# Patient Record
Sex: MALE | Race: BLACK OR AFRICAN AMERICAN | Employment: FULL TIME | URBAN - METROPOLITAN AREA
[De-identification: names, ages, dates, MRNs, and addresses within clinical notes are randomized per-mention and may not be internally consistent; named-entity substitution may affect disease eponyms.]

---

## 2020-11-12 ENCOUNTER — OFFICE VISIT (OUTPATIENT)
Dept: ORTHOPEDIC SURGERY | Age: 60
End: 2020-11-12
Payer: COMMERCIAL

## 2020-11-12 VITALS — WEIGHT: 315 LBS | BODY MASS INDEX: 44.1 KG/M2 | HEIGHT: 71 IN

## 2020-11-12 PROCEDURE — 99213 OFFICE O/P EST LOW 20 MIN: CPT | Performed by: ORTHOPAEDIC SURGERY

## 2020-11-12 RX ORDER — LIDOCAINE HYDROCHLORIDE 10 MG/ML
5 INJECTION, SOLUTION INFILTRATION; PERINEURAL ONCE
Status: COMPLETED | OUTPATIENT
Start: 2020-11-12 | End: 2020-11-13

## 2020-11-12 RX ORDER — BETAMETHASONE SODIUM PHOSPHATE AND BETAMETHASONE ACETATE 3; 3 MG/ML; MG/ML
6 INJECTION, SUSPENSION INTRA-ARTICULAR; INTRALESIONAL; INTRAMUSCULAR; SOFT TISSUE ONCE
Status: COMPLETED | OUTPATIENT
Start: 2020-11-12 | End: 2020-11-13

## 2020-11-12 NOTE — PROGRESS NOTES
Date of Encounter: 11/12/2020  Patient Marge Car    Chief Complaint   Patient presents with    Knee Pain     RT knee        History of Present Illness:  Patient seen here today for his right knee. Had bilateral hip replacements back in about 2015 or 16 by me which was done extremely well. Has about a 6-month history of significant right knee pain. Is mainly posterior with some medial pain. It is worse with activity though he does have some pain at night. Denies any previous surgery of his knee. History reviewed. No pertinent past medical history. History reviewed. No pertinent surgical history. No current outpatient medications on file. Current Facility-Administered Medications   Medication Dose Route Frequency Provider Last Rate Last Dose    lidocaine 1 % injection 5 mL  5 mL Intra-articular Once Mattie Trinidad MD        betamethasone acetate-betamethasone sodium phosphate (CELESTONE) injection 6 mg  6 mg Intra-articular Once Emmie Osborne MD          allergies, social and family histories were reviewed and updated as appropriate. Review of Systems:  Relevant review of systems reviewed and available in the patient's chart and scanned in under the MEDIA tab on 11/12/2020. Vital Signs:  Ht 5' 11\" (1.803 m)   Wt (!) 315 lb (142.9 kg)   BMI 43.93 kg/m²     General Exam:   Constitutional: He is adequately groomed with no evidence of malnutrition, obesity present  Mental Status: He is oriented to time, place and person. Normal mentation and affect for age. Lymphatic: The lymphatic examination bilaterally reveals all areas to be without enlargement or induration. Vascular: Examination reveals no swelling or calf tenderness. Peripheral pulses are palpable and 2+. Neurological: He has good coordination and balance. There is no focal weakness or sensory deficit.     Pertinent Exam:  Right knee has medial joint line pain along with medial and patellofemoral crepitance and a mild to moderate synovitis. Lacks probably about 6 or 7 degrees extension about 115 degrees of flexion. No instability. No pain with logroll. Xray Findings:  4 views of the right knee show complete loss of the medial joint space with subchondral sclerosis, cyst formation and osteophyte formation. Also significant degenerative changes of patellofemoral joint. Standing AP lateral of the left knee shows near complete loss of the medial joint space    Assessment & Plan:  Patient has advanced osteoarthritis of his right knee. Lengthy discussion with him regarding the various options. He does some aqua therapy now but even it has been difficult lately. Have elected to inject his right knee today with steroid for symptomatic relief. Have discussed viscosupplementation as well as this may be a good option for him. Eventually certainly will require knee replacement. Questions have been answered. He will follow up as symptoms dictate  We reviewed continued use of prescription and OTC medications to alleviate pain. We discussed the option of cortisone injection as well as its risks and benefits. We discussed the potential to improve pain and function as variability in response to injection among patients. He agreed to receive a cortisone injection today. The right knee was prepped with Betadine and 1 mL of betamethasone mixed with 5 mL 1% lidocaine plain were instilled with careful aspiration and injection under aseptic technique. He tolerated this well and was instructed to call back over the next 3-4 days and leave a message regarding how much or how little the injection seemed to help. Documentation was done using voice recognition dragon software. Every effort was made to ensure accuracy; however, inadvertent  Unintentional computerized transcription errors may be present.

## 2020-11-13 RX ADMIN — BETAMETHASONE SODIUM PHOSPHATE AND BETAMETHASONE ACETATE 6 MG: 3; 3 INJECTION, SUSPENSION INTRA-ARTICULAR; INTRALESIONAL; INTRAMUSCULAR; SOFT TISSUE at 08:44

## 2020-11-13 RX ADMIN — LIDOCAINE HYDROCHLORIDE 5 ML: 10 INJECTION, SOLUTION INFILTRATION; PERINEURAL at 08:43

## 2020-12-15 ENCOUNTER — OFFICE VISIT (OUTPATIENT)
Dept: ORTHOPEDIC SURGERY | Age: 60
End: 2020-12-15
Payer: COMMERCIAL

## 2020-12-15 VITALS — BODY MASS INDEX: 44.1 KG/M2 | WEIGHT: 315 LBS | HEIGHT: 71 IN

## 2020-12-15 PROCEDURE — 99213 OFFICE O/P EST LOW 20 MIN: CPT | Performed by: ORTHOPAEDIC SURGERY

## 2020-12-15 PROCEDURE — G8417 CALC BMI ABV UP PARAM F/U: HCPCS | Performed by: ORTHOPAEDIC SURGERY

## 2020-12-15 PROCEDURE — 1036F TOBACCO NON-USER: CPT | Performed by: ORTHOPAEDIC SURGERY

## 2020-12-15 PROCEDURE — 3017F COLORECTAL CA SCREEN DOC REV: CPT | Performed by: ORTHOPAEDIC SURGERY

## 2020-12-15 PROCEDURE — G8484 FLU IMMUNIZE NO ADMIN: HCPCS | Performed by: ORTHOPAEDIC SURGERY

## 2020-12-15 PROCEDURE — G8427 DOCREV CUR MEDS BY ELIG CLIN: HCPCS | Performed by: ORTHOPAEDIC SURGERY

## 2020-12-15 NOTE — PROGRESS NOTES
Date of Encounter: 12/15/2020  Patient Lia Cyr    Chief Complaint   Patient presents with    Knee Pain     RT knee        History of Present Illness:  Patient seen today for follow-up of his knees. Unfortunately the steroid injection did not really help much at all. Realizes knee replacement is the ultimate solution but still does not feel ready for that. History reviewed. No pertinent past medical history. History reviewed. No pertinent surgical history. No current outpatient medications on file. No current facility-administered medications for this visit. allergies, social and family histories were reviewed and updated as appropriate. Review of Systems:  Relevant review of systems reviewed and available in the patient's chart and scanned in under the MEDIA tab on 12/15/2020. Vital Signs:  Ht 5' 11\" (1.803 m)   Wt (!) 315 lb (142.9 kg)   BMI 43.93 kg/m²     General Exam:   Constitutional: He is adequately groomed with no evidence of malnutrition, obesity present  Mental Status: He is oriented to time, place and person. Normal mentation and affect for age. Lymphatic: The lymphatic examination bilaterally reveals all areas to be without enlargement or induration. Vascular: Examination reveals no swelling or calf tenderness. Peripheral pulses are palpable and 2+. Neurological: He has good coordination and balance. There is no focal weakness or sensory deficit. Pertinent Exam:  Exam is unchanged    Xray Findings:  No new x-rays were obtained    Assessment & Plan:  Patient has bilateral knee osteoarthritis. Would like to proceed with viscosupplementation and we will get approval for that. We will see him back once that is been approved      Documentation was done using voice recognition dragon software. Every effort was made to ensure accuracy; however, inadvertent  Unintentional computerized transcription errors may be present.

## 2020-12-18 ENCOUNTER — TELEPHONE (OUTPATIENT)
Dept: ORTHOPEDIC SURGERY | Age: 60
End: 2020-12-18

## 2020-12-18 ENCOUNTER — OFFICE VISIT (OUTPATIENT)
Dept: ORTHOPEDIC SURGERY | Age: 60
End: 2020-12-18
Payer: COMMERCIAL

## 2020-12-18 VITALS — HEIGHT: 71 IN | WEIGHT: 315 LBS | BODY MASS INDEX: 44.1 KG/M2

## 2020-12-18 PROCEDURE — 3017F COLORECTAL CA SCREEN DOC REV: CPT | Performed by: ORTHOPAEDIC SURGERY

## 2020-12-18 PROCEDURE — G8427 DOCREV CUR MEDS BY ELIG CLIN: HCPCS | Performed by: ORTHOPAEDIC SURGERY

## 2020-12-18 PROCEDURE — G8417 CALC BMI ABV UP PARAM F/U: HCPCS | Performed by: ORTHOPAEDIC SURGERY

## 2020-12-18 PROCEDURE — 99212 OFFICE O/P EST SF 10 MIN: CPT | Performed by: ORTHOPAEDIC SURGERY

## 2020-12-18 PROCEDURE — G8484 FLU IMMUNIZE NO ADMIN: HCPCS | Performed by: ORTHOPAEDIC SURGERY

## 2020-12-18 PROCEDURE — 20610 DRAIN/INJ JOINT/BURSA W/O US: CPT | Performed by: ORTHOPAEDIC SURGERY

## 2020-12-18 PROCEDURE — 1036F TOBACCO NON-USER: CPT | Performed by: ORTHOPAEDIC SURGERY

## 2020-12-18 RX ORDER — HYALURONATE SODIUM 10 MG/ML
20 SYRINGE (ML) INTRAARTICULAR ONCE
Status: COMPLETED | OUTPATIENT
Start: 2020-12-18 | End: 2020-12-18

## 2020-12-18 RX ADMIN — Medication 20 MG: at 15:22

## 2020-12-18 RX ADMIN — Medication 20 MG: at 15:21

## 2020-12-18 NOTE — TELEPHONE ENCOUNTER
12/17/2020  EUFLEXXA  (SERIES OF 3)    BILATERAL KNEES. NO AUTHORIZATION REQUIRED. VALID & BILLABLE. CAN BUY& BILL. PER GORDO @ [facility] REF [id number].   AP

## 2020-12-18 NOTE — PROGRESS NOTES
Date of Encounter: 12/18/2020  Patient Adrienne Shaw    Chief Complaint   Patient presents with    Knee Pain     FAVIAN knee        History of Present Illness:  Patient seen today for follow-up of his knees. Has end-stage osteoarthritis of both knees but is elected to proceed with viscosupplementation and been approved for Euflexxa    History reviewed. No pertinent past medical history. History reviewed. No pertinent surgical history. No current outpatient medications on file. No current facility-administered medications for this visit. allergies, social and family histories were reviewed and updated as appropriate. Review of Systems:  Relevant review of systems reviewed and available in the patient's chart and scanned in under the MEDIA tab on 12/18/2020. Vital Signs:  Ht 5' 11\" (1.803 m)   Wt (!) 315 lb (142.9 kg)   BMI 43.93 kg/m²     General Exam:   Constitutional: He is adequately groomed with no evidence of malnutrition, obesity present  Mental Status: He is oriented to time, place and person. Normal mentation and affect for age. Lymphatic: The lymphatic examination bilaterally reveals all areas to be without enlargement or induration. Vascular: Examination reveals no swelling or calf tenderness. Peripheral pulses are palpable and 2+. Neurological: He has good coordination and balance. There is no focal weakness or sensory deficit. Pertinent Exam:  Knee exam is unchanged    Xray Findings:  No new x-rays were obtained    Assessment & Plan:  Patient has bilateral knee osteoarthritis. Have injected each knee with Euflexxa No. 1.  We will see him next week for the second injection      Documentation was done using voice recognition dragon software. Every effort was made to ensure accuracy; however, inadvertent  Unintentional computerized transcription errors may be present.

## 2020-12-22 ENCOUNTER — OFFICE VISIT (OUTPATIENT)
Dept: ORTHOPEDIC SURGERY | Age: 60
End: 2020-12-22
Payer: COMMERCIAL

## 2020-12-22 VITALS — WEIGHT: 315 LBS | HEIGHT: 71 IN | BODY MASS INDEX: 44.1 KG/M2

## 2020-12-22 PROCEDURE — 20610 DRAIN/INJ JOINT/BURSA W/O US: CPT | Performed by: ORTHOPAEDIC SURGERY

## 2020-12-22 RX ORDER — HYALURONATE SODIUM 10 MG/ML
20 SYRINGE (ML) INTRAARTICULAR ONCE
Status: COMPLETED | OUTPATIENT
Start: 2020-12-22 | End: 2020-12-22

## 2020-12-22 RX ADMIN — Medication 20 MG: at 16:21

## 2020-12-22 NOTE — PROGRESS NOTES
Date of Encounter: 12/22/2020  Patient Michelle Dominguez    Chief Complaint   Patient presents with    Knee Pain     FAVIAN knee       History of Present Illness:  Patient seen today for the second injection in both knees. Feels like he may have had a small amount of improvement already. History reviewed. No pertinent past medical history. History reviewed. No pertinent surgical history. No current outpatient medications on file. No current facility-administered medications for this visit. allergies, social and family histories were reviewed and updated as appropriate. Review of Systems:  Relevant review of systems reviewed and available in the patient's chart and scanned in under the MEDIA tab on 12/22/2020. Vital Signs:  Ht 5' 11\" (1.803 m)   Wt (!) 315 lb (142.9 kg)   BMI 43.93 kg/m²     General Exam:   Constitutional: He is adequately groomed with no evidence of malnutrition, obesity present  Mental Status: He is oriented to time, place and person. Normal mentation and affect for age. Lymphatic: The lymphatic examination bilaterally reveals all areas to be without enlargement or induration. Vascular: Examination reveals no swelling or calf tenderness. Peripheral pulses are palpable and 2+. Neurological: He has good coordination and balance. There is no focal weakness or sensory deficit. Pertinent Exam:  Exam is unchanged    Xray Findings:  No new x-rays were obtained    Assessment & Plan:  Patient has bilateral knee osteoarthritis. Have injected each knee with Euflexxa No. 2.  We will see him next week for the third and final injection      Documentation was done using voice recognition dragon software. Every effort was made to ensure accuracy; however, inadvertent  Unintentional computerized transcription errors may be present.

## 2020-12-29 ENCOUNTER — OFFICE VISIT (OUTPATIENT)
Dept: ORTHOPEDIC SURGERY | Age: 60
End: 2020-12-29
Payer: COMMERCIAL

## 2020-12-29 VITALS — WEIGHT: 315 LBS | HEIGHT: 71 IN | BODY MASS INDEX: 44.1 KG/M2

## 2020-12-29 PROCEDURE — 20610 DRAIN/INJ JOINT/BURSA W/O US: CPT | Performed by: ORTHOPAEDIC SURGERY

## 2020-12-29 RX ORDER — HYALURONATE SODIUM 10 MG/ML
20 SYRINGE (ML) INTRAARTICULAR ONCE
Status: COMPLETED | OUTPATIENT
Start: 2020-12-29 | End: 2020-12-29

## 2020-12-29 RX ADMIN — Medication 20 MG: at 10:38

## 2020-12-29 RX ADMIN — Medication 20 MG: at 10:39

## 2020-12-29 NOTE — PROGRESS NOTES
Date of Encounter: 12/29/2020  Patient Hussein Hussein    Chief Complaint   Patient presents with    Knee Pain     bilateral osteoarthritis       History of Present Illness:  Patient seen today for the third and final Euflexxa injection in each knee. Does feel like he has had a positive response to date. History reviewed. No pertinent past medical history. History reviewed. No pertinent surgical history. No current outpatient medications on file. Current Facility-Administered Medications   Medication Dose Route Frequency Provider Last Rate Last Admin    sodium hyaluronate (viscosup) injection 20 mg  20 mg Intra-articular Once Roberto Trinidad MD        sodium hyaluronate (viscosup) injection 20 mg  20 mg Intra-articular Once Perry Landry MD          allergies, social and family histories were reviewed and updated as appropriate. Review of Systems:  Relevant review of systems reviewed and available in the patient's chart and scanned in under the MEDIA tab on 12/29/2020. Vital Signs:  Ht 5' 11\" (1.803 m)   Wt (!) 315 lb (142.9 kg)   BMI 43.93 kg/m²     General Exam:   Constitutional: He is adequately groomed with no evidence of malnutrition, obesity present  Mental Status: He is oriented to time, place and person. Normal mentation and affect for age. Lymphatic: The lymphatic examination bilaterally reveals all areas to be without enlargement or induration. Vascular: Examination reveals no swelling or calf tenderness. Peripheral pulses are palpable and 2+. Neurological: He has good coordination and balance. There is no focal weakness or sensory deficit.     Pertinent Exam:  Exam is unchanged    Xray Findings:  No new x-rays were obtained    Assessment & Plan: Injected each knee with Euflexxa No. 3.  Discussed with him the possibly repeating this anytime after 6 months. He is really not had much success with steroid injections recently. Questions have been answered. He will follow up as symptoms dictate      Documentation was done using voice recognition dragon software. Every effort was made to ensure accuracy; however, inadvertent  Unintentional computerized transcription errors may be present.

## 2021-08-20 ENCOUNTER — HOSPITAL ENCOUNTER (OUTPATIENT)
Dept: PHYSICAL THERAPY | Age: 61
Setting detail: THERAPIES SERIES
Discharge: HOME | End: 2021-08-20
Payer: COMMERCIAL

## 2021-08-20 PROCEDURE — 97110 THERAPEUTIC EXERCISES: CPT

## 2021-08-20 PROCEDURE — 97162 PT EVAL MOD COMPLEX 30 MIN: CPT

## 2021-08-20 PROCEDURE — 97530 THERAPEUTIC ACTIVITIES: CPT

## 2021-08-20 NOTE — FLOWSHEET NOTE
[address] [facility] Physical Therapy  Phone: [phone]   Fax: [phone]    Physical Therapy Daily Treatment Note  Date:  2021    Patient Name:  Myles Kruger    :  1960  MRN: [id number]  Medical/Treatment Diagnosis Information:  Diagnosis: M17.11 (ICD-10-CM) - Unilateral primary osteoarthritis, right knee, Z96.651 (ICD-10-CM) - Presence of right artificial knee joint  Treatment Diagnosis: Decrease knee AROM , difficulty walking, imbalance  Insurance/Certification information:  PT Insurance Information: [facility]  Physician Information:  Referring Practitioner: Olive Dumont MD  Plan of care signed (Y/N): []  Yes [x]  No     Date of Patient follow up with Physician:      Progress Report: []  Yes  [x]  No     Date Range for reporting period:  Beginnin21  Ending:     Progress report due (10 Rx/or 30 days whichever is less): visit #10      Recertification due (POC duration/ or 90 days whichever is less): visit # 21    Visit # Insurance Allowable Auth required?  Date Range    Mn []  Yes  [x]  No NA       Latex Allergy:  [x]NO      []YES  Preferred Language for Healthcare:   [x]English       []other:    Functional Scale:        Date assessed:  LEFS: raw score =15 /80; dysfunction = 81%  21    Pain level:  8/10     SUBJECTIVE:  See eval    OBJECTIVE: See eval    Not  prehab just using values as baseline   Functional testing Pre hab        Date   eval post op   Date 21 4 week f/u   Date  8 week f/u  Date D/c            TUG  TBD      30 second sit to stand  TBD      6 minute walk  244      Balance        Narrow NIMOC  30s      Semi tandem        Tandem   R 30s, L 24s      SLS        Knee AROM  Flex 72, ext +2      Knee Extension MMT R/L  -3      Hip aBduction MMT R/L  -3      LEFS                      RESTRICTIONS/PRECAUTIONS:  B PRINCESS     Exercises/Interventions:     Therapeutic Exercises (96786) Resistance / level Sets/sec Reps Notes   Bike/ Nustep IB  HSS supine or seated       Quad sets       Heel slides       ERMI       Bridging                                    Therapeutic Activities (14244)                                          Neuromuscular Re-ed (65152)                                                 Manual Intervention (62047)       Patella mobs       Tibia/fib mobs/ distraction EOB                                       Modalities:     Pt. Education:  -patient educated on diagnosis, prognosis and expectations for rehab  -all patient questions were answered    Home Exercise Program:  Access Code: [email or web address]  URL: [employer]/  Date: 08/20/2021  Prepared by: Raul Byrnes    Exercises  Supine Quad Set - 1 x daily - 7 x weekly - 3 sets - 10 reps  Supine Heel Slide with Strap - 1 x daily - 7 x weekly - 3 sets - 10 reps - 5 hold  Supine Hip Abduction - 1 x daily - 7 x weekly - 3 sets - 10 reps  Supine Bridge - 1 x daily - 7 x weekly - 3 sets - 10 reps        Therapeutic Exercise and NMR EXR  [] (03971) Provided verbal/tactile cueing for activities related to strengthening, flexibility, endurance, ROM for improvements in  [] LE / Lumbar: LE, proximal hip, and core control with self care, mobility, lifting, ambulation. [] UE / Cervical: cervical, postural, scapular, scapulothoracic and UE control with self care, reaching, carrying, lifting, house/yardwork, driving, computer work.  [] (35219) Provided verbal/tactile cueing for activities related to improving balance, coordination, kinesthetic sense, posture, motor skill, proprioception to assist with   [] LE / lumbar: LE, proximal hip, and core control in self care, mobility, lifting, ambulation and eccentric single leg control.    [] UE / cervical: cervical, scapular, scapulothoracic and UE control with self care, reaching, carrying, lifting, house/yardwork, driving, computer work.   [] (07112) Therapist is in constant attendance of 2 or more patients providing skilled therapy computer work  [] (36545)Reviewed/Progressed HEP activities related to improving balance, coordination, kinesthetic sense, posture, motor skill, proprioception of   [] LE: core, proximal hip and LE for self care, mobility, lifting, and ambulation/stair navigation    [] UE / Cervical: cervical, postural,  scapular, scapulothoracic and UE control with self care, reaching, carrying, lifting, house/yardwork, driving, computer work    Manual Treatments:  PROM / STM / Oscillations-Mobs:  G-I, II, III, IV (PA's, Inf., Post.)  [] ([id number]) Provided manual therapy to mobilize LE, proximal hip and/or LS spine soft tissue/joints for the purpose of modulating pain, promoting relaxation,  increasing ROM, reducing/eliminating soft tissue swelling/inflammation/restriction, improving soft tissue extensibility and allowing for proper ROM for normal function with   [] LE / lumbar: self care, mobility, lifting and ambulation. [] UE / Cervical: self care, reaching, carrying, lifting, house/yardwork, driving, computer work. Modalities:  [] (60026) Vasopneumatic compression: Utilized vasopneumatic compression to decrease edema / swelling for the purpose of improving mobility and quad tone / recruitment which will allow for increased overall function including but not limited to self-care, transfers, ambulation, and ascending / descending stairs.      Charges:  Timed Code Treatment Minutes: 27   Total Treatment Minutes: 43     [x] EVAL - LOW (46747)   [x] EVAL - MOD (75521)  [] EVAL - HIGH (10392)  [] RE-EVAL (36406)  [x] FC(95731) x       [] Ionto  [] NMR (61996) x       [] Vaso  [] Manual (61425) x       [] Ultrasound  [x] TA x        [] Mech Traction (11237)  [] Aquatic Therapy x     [] ES (un) (61988):   [] Home Management Training x  [] ES(attended) (30824)   [] Dry Needling 1-2 muscles (79788):  [] Dry Needling 3+ muscles (217283)  [] Group:      [] Other:     GOALS:   Patient stated goal: to improve full range of motion,  []? Progressing: []? Met: []? Not Met: []? Adjusted     Therapist goals for Patient:   Short Term Goals: To be achieved in: 2 weeks  1. Independent in HEP and progression per patient tolerance, in order to prevent re-injury. []? Progressing: []? Met: []? Not Met: []? Adjusted  2. Patient will have a decrease in pain to facilitate improvement in movement, function, and ADLs as indicated by Functional Deficits. []? Progressing: []? Met: []? Not Met: []? Adjusted     Long Term Goals: To be achieved in: 7 weeks/ DC   1. Disability index score of 20% or less for the LEFS to assist with reaching prior level of function. []? Progressing: []? Met: []? Not Met: []? Adjusted  2. Patient will demonstrate increased AROM to right knee flexion to 120 deg and knee extension 0 deg to allow for proper joint functioning as indicated by patients Functional Deficits. []? Progressing: []? Met: []? Not Met: []? Adjusted  3. Patient will demonstrate an increase in Strength to at least +4/5 as well as good proximal hip strength and control to allow for proper functional mobility as indicated by patients Functional Deficits. []? Progressing: []? Met: []? Not Met: []? Adjusted  4. Patient will return to functional activities including  ambulation without AD without increased symptoms or restriction. []? Progressing: []? Met: []? Not Met: []? Adjusted  5. Patient to be able to sleep through the night without symptoms . []? Progressing: []? Met: []? Not Met: []? Adjusted       Overall Progression Towards Functional goals/ Treatment Progress Update:  [] Patient is progressing as expected towards functional goals listed. [] Progression is slowed due to complexities/Impairments listed. [] Progression has been slowed due to co-morbidities.   [x] Plan just implemented, too soon to assess goals progression <30days   [] Goals require adjustment due to lack of progress  [] Patient is not progressing as expected and requires additional follow up with physician  [] Other    Persisting Functional Limitations/Impairments:  []Sleeping []Sitting               []Standing []Transfers        [x]Walking []Kneeling               []Stairs [x]Squatting / bending   []ADLs []Reaching  []Lifting  []Housework  []Driving []Job related tasks  []Sports/Recreation []Other:        ASSESSMENT:  See eval  Treatment/Activity Tolerance:  [] Patient able to complete tx [] Patient limited by fatigue  [] Patient limited by pain  [] Patient limited by other medical complications  [] Other:     Prognosis: [] Good [] Fair  [] Poor    Patient Requires Follow-up: [x] Yes  [] No    Plan for next treatment session:      PLAN: See eval. PT 3x / week for  7 weeks. [] Continue per plan of care [] Alter current plan (see comments)  [x] Plan of care initiated [] Hold pending MD visit [] Discharge    Electronically signed by: Chelita Medley, PT PT, DPT    Note: If patient does not return for scheduled/ recommended follow up visits, this note will serve as a discharge from care along with most recent update on progress.

## 2021-08-20 NOTE — PLAN OF CARE
[address] [address], [address] [place] [address] Drive  Phone: [phone]   Fax: [phone]                                                       Physical Therapy Certification    Dear Referring Practitioner: Saqib Mccarty MD,    We had the pleasure of evaluating the following patient for physical therapy services at [address] [place] [address]. A summary of our findings can be found in the initial assessment below. This includes our plan of care. If you have any questions or concerns regarding these findings, please do not hesitate to contact me at the office phone number checked above. Thank you for the referral.       Physician Signature:_______________________________Date:__________________  By signing above (or electronic signature), therapists plan is approved by physician      Patient: Ktaie Clement   : 1960   MRN: [id number]  Referring Physician: Referring Practitioner: Saqib Mccarty MD      Evaluation Date: 2021      Medical Diagnosis Information:  Diagnosis: M17.11 (ICD-10-CM) - Unilateral primary osteoarthritis, right knee, Z96.651 (ICD-10-CM) - Presence of right artificial knee joint   Treatment Diagnosis: Decrease R knee AROM , difficulty walking, and imbalance                                         Insurance information: PT Insurance Information: [facility]     Precautions/ Contra-indications:   Latex Allergy:  [x]NO      []YES  Preferred Language for Healthcare:   [x]English       []Other:    C-SSRS Triggered by Intake questionnaire (Past 2 wk assessment ):   [x] No, Questionnaire did not trigger screening.   [] Yes, Patient intake triggered C-SSRS Screening     [] Completed, no further action required. [] Completed, PCP notified via Epic    SUBJECTIVE: Patient stated complaint: Patient recently had right TKA 21 from OA. In the past he has had B PRINCESS.  Currently having difficulty walking , standing, and sitting for prolong periods. He has had previous  B PRINCESS is well aware of the rehab process. His goal is to improve full range in left knee     Relevant Medical History:  B PRINCESS   Functional Outcome: LEFS  raw score =15 ; dysfunction =81 %    Pain Scale: 8/10  Easing factors:  Ice and rest   Provocative factors:   Standing , sitting     Type: []Constant   [x]Intermittent  []Radiating []Localized []Other:     Numbness/Tingling: over surgical site only     Occupation/School: retired    Living Status/Prior Level of Function:Prior to this injury / incident, pt was independent with ADLs and IADLs,  Patient lives with spouse two story house with steps inside. Prior to right knee surgery was very active with swimming at the [facility] . OBJECTIVE:   Palpation:  R knee edema     Functional Mobility/Transfers:  Mod I     Posture: increase hip flexion     Bandages/Dressings/Incisions: Swelling : Femur 55cm, tibia 45cm    Gait: (include devices/WB status) Gait with rolling walker antalgic pattern on RLE, decrease knee flexion     Dermatomes Normal Abnormal Comments   inguinal area (L1)  x     anterior mid-thigh (L2) x     distal ant thigh/med knee (L3) x     medial lower leg and foot (L4) x     lateral lower leg and foot (L5) x     posterior calf (S1) x     medial calcaneus (S2) x          PROM AROM    L R L R   Hip Flexion       Hip Abduction       Hip ER       Hip IR       Knee Flexion    72   Knee Extension    +2   Dorsiflexion        Plantarflexion        Inversion        Eversion            Strength (0-5) / Myotomes Left Right   Hip Flexion - supine  4   Hip Flexion - seated (L1-2) +4 4   Hip Abduction +4    Hip Adduction     Hip ER +4    Hip IR +4    Quads (L2-4) +4 3-   Hamstrings +4 3-   Ankle Dorsiflexion (L4-5) +4    Ankle Plantarflexion (S1-2)     Ankle Inversion     Ankle Eversion (S1-2)     Great Toe Extension (L5) Joint mobility:    []Normal    [x]Hypo  Right knee    []Hyper    Orthopaedic Special Tests  Positive  Negative  NT Comments    Hip   x    RANDI / Gustavo's       FADIR       Scour       Trendelenburg              Knee   x    Lachman's / Anterior Drawer       Posterior Drawer       Varus Stress       Valgus Stress       Sidney's        Appley's       Thessaly's       Patellar Tracking              Ankle   x    Anterior Drawer       Talar Tilt       Soliz       Caryl's                   Balance:  Fair                        [x] Patient history, allergies, meds reviewed. Medical chart reviewed. See intake form. Review Of Systems (ROS):  [x]Performed Review of systems (Integumentary, CardioPulmonary, Neurological) by intake and observation. Intake form has been scanned into medical record. Patient has been instructed to contact their primary care physician regarding ROS issues if not already being addressed at this time.       Co-morbidities/Complexities (which will affect course of rehabilitation):   []None        []Hx of COVID   Arthritic conditions   []Rheumatoid arthritis (M05.9)  [x]Osteoarthritis (M19.91)  []Gout   Cardiovascular conditions   []Hypertension (I10)  []Hyperlipidemia (E78.5)  []Angina pectoris (I20)  []Atherosclerosis (I70)  []Pacemaker  []Hx of CABG/stent/  cardiac surgeries   Musculoskeletal conditions   []Disc pathology   []Congenital spine pathologies   []Osteoporosis (M81.8)  []Osteopenia (M85.8)  []Scoliosis       Endocrine conditions   []Hypothyroid (E03.9)  []Hyperthyroid Gastrointestinal conditions   []Constipation (O86.07)   Metabolic conditions   []Morbid obesity (E66.01)  []Diabetes type 1(E10.65) or 2 (E11.65)   []Neuropathy (G60.9)     Cardio/Pulmonary conditions   []Asthma (J45)  []Coughing   []COPD (J44.9)  []CHF  []A-fib   Psychological Disorders  []Anxiety (F41.9)  []Depression (F32.9)   []Other:   Developmental Disorders  []Autism (F84.0)  []CP (G80)  []Down Syndrome (Q90.9)  []Developmental delay     Neurological conditions  []Prior Stroke (I69.30)  []Parkinson's (G20)  []Encephalopathy (G93.40)  []MS (G35)  []Post-polio (G14)  []SCI  []TBI  []ALS Other conditions  []Fibromyalgia (M79.7)  []Vertigo  []Syncope  []Kidney Failure  []Cancer      []currently undergoing                treatment  []Pregnancy  []Incontinence   Prior surgeries  []involved limb  []previous spinal surgery  [] section birth  []hysterectomy  []bowel / bladder surgery  []other relevant surgeries   []Other:              Barriers to/and or personal factors that will affect rehab potential:              []Age  []Sex    []Smoker              []Motivation/Lack of Motivation                        []Co-Morbidities              []Cognitive Function, education/learning barriers              []Environmental, home barriers              []profession/work barriers  []past PT/medical experience  []other:  Justification:   Falls Risk Assessment (30 days):   [x] Falls Risk assessed and no intervention required. [] Falls Risk assessed and Patient requires intervention due to being higher risk   TUG score (>12s at risk):     [] Falls education provided, including        ASSESSMENT:  The patient is a 56 yo male recently had a R TKA. Presents with decrease knee flexion/ext. Demonstrates mild to moderate swelling at right knee joint. Gait is impaired and has balance deficits s/p surgery.    Functional Impairments:     []Noted lumbar/proximal hip/LE joint hypomobility   []Decreased LE functional ROM   []Decreased core/proximal hip strength and neuromuscular control   [x]Decreased LE functional strength   []Reduced balance/proprioceptive control   []other:      Functional Activity Limitations (from functional questionnaire and intake)   []Reduced ability to tolerate prolonged functional positions   [x]Reduced ability or difficulty with changes of positions or transfers between positions   []Reduced ability to maintain good posture and demonstrate good body mechanics with sitting, bending, and lifting   [x]Reduced ability to sleep   [] Reduced ability or tolerance with driving and/or computer work   []Reduced ability to perform lifting, carrying tasks   [x]Reduced ability to squat   []Reduced ability to forward bend   [x]Reduced ability to ambulate prolonged functional periods/distances/surfaces   [x]Reduced ability to ascend/descend stairs   []Reduced ability to run, hop, cut or jump   []other:    Participation Restrictions   []Reduced participation in self care activities   [x]Reduced participation in home management activities   []Reduced participation in work activities   [x]Reduced participation in social activities. []Reduced participation in sport/recreation activities. Classification :    [x]Signs/symptoms consistent with post-surgical status including decreased ROM, strength and function.    []Signs/symptoms consistent with joint sprain/strain   []Signs/symptoms consistent with patella-femoral syndrome   [x]Signs/symptoms consistent with knee OA/hip OA   []Signs/symptoms consistent with internal derangement of knee/Hip   []Signs/symptoms consistent with functional hip weakness/NMR control      []Signs/symptoms consistent with tendinitis/tendinosis    []signs/symptoms consistent with pathology which may benefit from Dry needling      []other:      Prognosis/Rehab Potential:      [x]Excellent   []Good    []Fair   []Poor    Tolerance of evaluation/treatment:    [x]Excellent   []Good    []Fair   []Poor    Physical Therapy Evaluation Complexity Justification  [x] A history of present problem with:  [] no personal factors and/or comorbidities that impact the plan of care;  [x]1-2 personal factors and/or comorbidities that impact the plan of care  []3 personal factors and/or comorbidities that impact the plan of care  [x] An examination of body systems using standardized tests and measures addressing any [] Not Met: [] Adjusted    Long Term Goals: To be achieved in: 7 weeks/ DC   1. Disability index score of 20% or less for the LEFS to assist with reaching prior level of function. [] Progressing: [] Met: [] Not Met: [] Adjusted  2. Patient will demonstrate increased AROM to right knee flexion to 120 deg and knee extension 0 deg to allow for proper joint functioning as indicated by patients Functional Deficits. [] Progressing: [] Met: [] Not Met: [] Adjusted  3. Patient will demonstrate an increase in Strength to at least +4/5 as well as good proximal hip strength and control to allow for proper functional mobility as indicated by patients Functional Deficits. [] Progressing: [] Met: [] Not Met: [] Adjusted  4. Patient will return to functional activities including  ambulation without AD without increased symptoms or restriction. [] Progressing: [] Met: [] Not Met: [] Adjusted  5. Patient to be able to sleep through the night without symptoms .      [] Progressing: [] Met: [] Not Met: [] Adjusted     Electronically signed by:  Sean Alvarez, PT

## 2021-08-24 ENCOUNTER — HOSPITAL ENCOUNTER (OUTPATIENT)
Dept: PHYSICAL THERAPY | Age: 61
Setting detail: THERAPIES SERIES
Discharge: HOME | End: 2021-08-24
Payer: COMMERCIAL

## 2021-08-24 PROCEDURE — 97110 THERAPEUTIC EXERCISES: CPT

## 2021-08-24 PROCEDURE — 97140 MANUAL THERAPY 1/> REGIONS: CPT

## 2021-08-24 PROCEDURE — 97016 VASOPNEUMATIC DEVICE THERAPY: CPT

## 2021-08-24 NOTE — FLOWSHEET NOTE
[facility] Physical Therapy  Phone: [phone]   Fax: [phone]    Physical Therapy Daily Treatment Note  Date:  2021    Patient Name:  Jesus Jones    :  1960  MRN: [id number]  Medical/Treatment Diagnosis Information:  Diagnosis: M17.11 (ICD-10-CM) - Unilateral primary osteoarthritis, right knee, Z96.651 (ICD-10-CM) - Presence of right artificial knee joint  Treatment Diagnosis: Decrease knee AROM , difficulty walking, imbalance  Insurance/Certification information:  PT Insurance Information: [facility]  Physician Information:  Referring Practitioner: Afsaneh Xiao MD  Plan of care signed (Y/N): []  Yes [x]  No     Date of Patient follow up with Physician: 21     Progress Report: []  Yes  [x]  No     Date Range for reporting period:  Beginnin21  Ending:     Progress report due (10 Rx/or 30 days whichever is less): visit #08      Recertification due (POC duration/ or 90 days whichever is less): visit # 21    Visit # Insurance Allowable Auth required? Date Range    Mn []  Yes  [x]  No NA       Latex Allergy:  [x]NO      []YES  Preferred Language for Healthcare:   [x]English       []other:    Functional Scale:        Date assessed:  LEFS: raw score =15 /80; dysfunction = 81%  21    Pain level:  8/10     SUBJECTIVE:  Pt reports high pain levels this date and is ambulating with FWW. Pt reports HEP is going well without significant increases in baseline symptoms experienced when completing exercises.      OBJECTIVE:   21:EOB A/A R Knee Flexion: 76 degrees     Not  prehab just using values as baseline   Functional testing Pre hab        Date   eval post op   Date 21 4 week f/u   Date  8 week f/u  Date D/c            TUG  TBD      30 second sit to stand  TBD      6 minute walk  244      Balance        Narrow NIMCO  30s      Semi tandem        Tandem   R 30s, L 24s      SLS        Knee AROM  Flex 72, ext +2      Knee Extension MMT R/L  -3 Hip aBduction MMT R/L  -3      LEFS                      RESTRICTIONS/PRECAUTIONS:  B PRINCESS     Exercises/Interventions:     Therapeutic Exercises (03507) Resistance / level Sets/sec Reps Notes   Bike/ Nustep       EOB A/A Flexion/Ext   5\"H  10x  8/24-added    Long Sit Calf Stretch with Strap   30\"  3x 8/24-added    IB  HSS supine or seated       Standing HR   2 10  8/24-added    Quad sets  5\"H  15x     Heel slides  10\"H 5x     ERMI       Bridging        Standing HSC   2 10  8/24-added                        Therapeutic Activities (95167)                                          Neuromuscular Re-ed (88720)       SAQ  5\"H  10x  8/24-added                                       Manual Intervention (79939)       Patella mobs  4'     Tibia/fib mobs/ distraction EOB  6'                                     Modalities: 8/24: VASO x 10' mod pressure in long sit position    Pt. Education:  -patient educated on diagnosis, prognosis and expectations for rehab  -all patient questions were answered    Home Exercise Program:  Access Code: [email or web address]  URL: [email or web address]  Date: 08/20/2021  Prepared by: Keke Sat    Exercises  Supine Quad Set - 1 x daily - 7 x weekly - 3 sets - 10 reps  Supine Heel Slide with Strap - 1 x daily - 7 x weekly - 3 sets - 10 reps - 5 hold  Supine Hip Abduction - 1 x daily - 7 x weekly - 3 sets - 10 reps  Supine Bridge - 1 x daily - 7 x weekly - 3 sets - 10 reps        Therapeutic Exercise and NMR EXR  [] (71787) Provided verbal/tactile cueing for activities related to strengthening, flexibility, endurance, ROM for improvements in  [] LE / Lumbar: LE, proximal hip, and core control with self care, mobility, lifting, ambulation.   [] UE / Cervical: cervical, postural, scapular, scapulothoracic and UE control with self care, reaching, carrying, lifting, house/yardwork, driving, computer work.  [] (03803) Provided verbal/tactile cueing for activities related to improving balance, coordination, kinesthetic sense, posture, motor skill, proprioception to assist with   [] LE / lumbar: LE, proximal hip, and core control in self care, mobility, lifting, ambulation and eccentric single leg control. [] UE / cervical: cervical, scapular, scapulothoracic and UE control with self care, reaching, carrying, lifting, house/yardwork, driving, computer work.   [] (12196) Therapist is in constant attendance of 2 or more patients providing skilled therapy interventions, but not providing any significant amount of measurable one-on-one time to either patient, for improvements in  [] LE / lumbar: LE, proximal hip, and core control in self care, mobility, lifting, ambulation and eccentric single leg control. [] UE / cervical: cervical, scapular, scapulothoracic and UE control with self care, reaching, carrying, lifting, house/yardwork, driving, computer work.      NMR and Therapeutic Activities:    [x] (19899 or 26053) Provided verbal/tactile cueing for activities related to improving balance, coordination, kinesthetic sense, posture, motor skill, proprioception and motor activation to allow for proper function of   [] LE: / Lumbar core, proximal hip and LE with self care and ADLs  [] UE / Cervical: cervical, postural, scapular, scapulothoracic and UE control with self care, carrying, lifting, driving, computer work.   [] (25700) Gait Re-education- Provided training and instruction to the patient for proper LE, core and proximal hip recruitment and positioning and eccentric body weight control with ambulation re-education including up and down stairs     Home Management Training / Self Care:  [] (54109) Provided self-care/home management training related to activities of daily living and compensatory training, and/or use of adaptive equipment for improvement with: ADLs and compensatory training, meal preparation, safety procedures and instruction in use of adaptive equipment, including bathing, grooming, dressing, personal hygiene, basic household cleaning and chores. Home Exercise Program:    [x] (54981) Reviewed/Progressed HEP activities related to strengthening, flexibility, endurance, ROM of   [] LE / Lumbar: core, proximal hip and LE for functional self-care, mobility, lifting and ambulation/stair navigation   [] UE / Cervical: cervical, postural, scapular, scapulothoracic and UE control with self care, reaching, carrying, lifting, house/yardwork, driving, computer work  [] (35106)Reviewed/Progressed HEP activities related to improving balance, coordination, kinesthetic sense, posture, motor skill, proprioception of   [] LE: core, proximal hip and LE for self care, mobility, lifting, and ambulation/stair navigation    [] UE / Cervical: cervical, postural,  scapular, scapulothoracic and UE control with self care, reaching, carrying, lifting, house/yardwork, driving, computer work    Manual Treatments:  PROM / STM / Oscillations-Mobs:  G-I, II, III, IV (PA's, Inf., Post.)  [] (71850) Provided manual therapy to mobilize LE, proximal hip and/or LS spine soft tissue/joints for the purpose of modulating pain, promoting relaxation,  increasing ROM, reducing/eliminating soft tissue swelling/inflammation/restriction, improving soft tissue extensibility and allowing for proper ROM for normal function with   [] LE / lumbar: self care, mobility, lifting and ambulation. [] UE / Cervical: self care, reaching, carrying, lifting, house/yardwork, driving, computer work. Modalities:  [x] (28018) Vasopneumatic compression: Utilized vasopneumatic compression to decrease edema / swelling for the purpose of improving mobility and quad tone / recruitment which will allow for increased overall function including but not limited to self-care, transfers, ambulation, and ascending / descending stairs.      Charges:  Timed Code Treatment Minutes: 44   Total Treatment Minutes: 54     [] EVAL - LOW (59016)   [] EVAL - MOD (28348)  [] EVAL - HIGH (45349)  [] RE-EVAL (79980)  [x] VY(12386) x 2      [] Ionto  [] NMR (73328) x       [x] Vaso  [x] Manual (95593) x  1     [] Ultrasound  [] TA x        [] Mech Traction (83522)  [] Aquatic Therapy x     [] ES (un) (54214):   [] Home Management Training x  [] ES(attended) (46431)   [] Dry Needling 1-2 muscles (65871):  [] Dry Needling 3+ muscles (741605)  [] Group:      [] Other:     GOALS:   Patient stated goal: to improve full range of motion,  []? Progressing: []? Met: []? Not Met: []? Adjusted     Therapist goals for Patient:   Short Term Goals: To be achieved in: 2 weeks  1. Independent in HEP and progression per patient tolerance, in order to prevent re-injury. []? Progressing: []? Met: []? Not Met: []? Adjusted  2. Patient will have a decrease in pain to facilitate improvement in movement, function, and ADLs as indicated by Functional Deficits. []? Progressing: []? Met: []? Not Met: []? Adjusted     Long Term Goals: To be achieved in: 7 weeks/ DC   1. Disability index score of 20% or less for the LEFS to assist with reaching prior level of function. []? Progressing: []? Met: []? Not Met: []? Adjusted  2. Patient will demonstrate increased AROM to right knee flexion to 120 deg and knee extension 0 deg to allow for proper joint functioning as indicated by patients Functional Deficits. []? Progressing: []? Met: []? Not Met: []? Adjusted  3. Patient will demonstrate an increase in Strength to at least +4/5 as well as good proximal hip strength and control to allow for proper functional mobility as indicated by patients Functional Deficits. []? Progressing: []? Met: []? Not Met: []? Adjusted  4. Patient will return to functional activities including  ambulation without AD without increased symptoms or restriction. []? Progressing: []? Met: []? Not Met: []? Adjusted  5. Patient to be able to sleep through the night without symptoms . []? Progressing: []? Met: []?  Not Met: []? Adjusted       Overall Progression Towards Functional goals/ Treatment Progress Update:  [] Patient is progressing as expected towards functional goals listed. [] Progression is slowed due to complexities/Impairments listed. [] Progression has been slowed due to co-morbidities. [x] Plan just implemented, too soon to assess goals progression <30days   [] Goals require adjustment due to lack of progress  [] Patient is not progressing as expected and requires additional follow up with physician  [] Other    Persisting Functional Limitations/Impairments:  []Sleeping []Sitting               []Standing []Transfers        [x]Walking []Kneeling               []Stairs [x]Squatting / bending   []ADLs []Reaching  []Lifting  []Housework  []Driving []Job related tasks  []Sports/Recreation []Other:        ASSESSMENT:  Pt demonstrates difficulty with mobility of R LE and requires assistance for MAT mobility for change of positions in between exercises. Pt reports increased pain and pressure in ant R knee with A/A flexion this date with mild improvements in ROM when compared to initial post operative visit. Fair quad facilitation and contraction displayed this date and pt advised to continue with HEP to improve LE muscle activation to improve outcomes and function. VASO intervention performed at end of session to decrease post op edema and muscle fatigue present to improve R knee ROM and pt's quality of life. Treatment/Activity Tolerance:  [] Patient able to complete tx  [x] Patient limited by fatigue  [x] Patient limited by pain  [] Patient limited by other medical complications  [] Other:     Prognosis: [] Good [] Fair  [] Poor    Patient Requires Follow-up: [x] Yes  [] No    Plan for next treatment session:      PLAN: See eval. PT 3x / week for  7 weeks.    [x] Continue per plan of care [] Alter current plan (see comments)  [] Plan of care initiated [] Hold pending MD visit [] Discharge    Electronically signed by: [place], [place] [id number]    Note: If patient does not return for scheduled/ recommended follow up visits, this note will serve as a discharge from care along with most recent update on progress.

## 2021-08-25 ENCOUNTER — APPOINTMENT (OUTPATIENT)
Dept: PHYSICAL THERAPY | Age: 61
End: 2021-08-25
Payer: COMMERCIAL

## 2021-08-26 ENCOUNTER — HOSPITAL ENCOUNTER (OUTPATIENT)
Dept: PHYSICAL THERAPY | Age: 61
Setting detail: THERAPIES SERIES
Discharge: HOME | End: 2021-08-26
Payer: COMMERCIAL

## 2021-08-26 PROCEDURE — 97110 THERAPEUTIC EXERCISES: CPT

## 2021-08-26 PROCEDURE — 97016 VASOPNEUMATIC DEVICE THERAPY: CPT

## 2021-08-26 PROCEDURE — 97140 MANUAL THERAPY 1/> REGIONS: CPT

## 2021-08-26 PROCEDURE — 97530 THERAPEUTIC ACTIVITIES: CPT

## 2021-08-26 NOTE — FLOWSHEET NOTE
[facility] Physical Therapy  Phone: [phone]   Fax: [phone]    Physical Therapy Daily Treatment Note  Date:  2021    Patient Name:  Belinda Gonzalez    :  1960  MRN: [id number]  Medical/Treatment Diagnosis Information:  Diagnosis: M17.11 (ICD-10-CM) - Unilateral primary osteoarthritis, right knee, Z96.651 (ICD-10-CM) - Presence of right artificial knee joint  Treatment Diagnosis: Decrease knee AROM , difficulty walking, imbalance  Insurance/Certification information:  PT Insurance Information: [facility]  Physician Information:  Referring Practitioner: Bria Elkins MD  Plan of care signed (Y/N): []  Yes [x]  No     Date of Patient follow up with Physician: 21     Progress Report: []  Yes  [x]  No     Date Range for reporting period:  Beginnin21  Ending:     Progress report due (10 Rx/or 30 days whichever is less): visit #22      Recertification due (POC duration/ or 90 days whichever is less): visit # 21    Visit # Insurance Allowable Auth required? Date Range   3/20 [place] []  Yes  [x]  No NA       Latex Allergy:  [x]NO      []YES  Preferred Language for Healthcare:   [x]English       []other:    Functional Scale:        Date assessed:  LEFS: raw score =15 /80; dysfunction = 81%  21    Pain level:  6-8/10     SUBJECTIVE:  Pt reports he has been doing ok, knee still higher on pain level scale on regular basis, still increases with activity. Pain medication on a consistent basis as well due to higher pain levels. Working on knee ROM at home, able to get to 90 degrees but does not rest that way. OBJECTIVE:   : AROM knee ext 0 with quad set, knee flexion 76 degrees with heel slide and overpressure.  Replaced top section of Tegaderm bandage due to rolling and falling off   -During session today in supine, pt was able to tolerate ROM and transition to strength activity however became significantly diaphoretic and short of breath transitioning reps        Therapeutic Exercise and NMR EXR  [x] (11941) Provided verbal/tactile cueing for activities related to strengthening, flexibility, endurance, ROM for improvements in  [x] LE / Lumbar: LE, proximal hip, and core control with self care, mobility, lifting, ambulation. [] UE / Cervical: cervical, postural, scapular, scapulothoracic and UE control with self care, reaching, carrying, lifting, house/yardwork, driving, computer work.  [] (78381) Provided verbal/tactile cueing for activities related to improving balance, coordination, kinesthetic sense, posture, motor skill, proprioception to assist with   [] LE / lumbar: LE, proximal hip, and core control in self care, mobility, lifting, ambulation and eccentric single leg control. [] UE / cervical: cervical, scapular, scapulothoracic and UE control with self care, reaching, carrying, lifting, house/yardwork, driving, computer work.   [] (04094) Therapist is in constant attendance of 2 or more patients providing skilled therapy interventions, but not providing any significant amount of measurable one-on-one time to either patient, for improvements in  [] LE / lumbar: LE, proximal hip, and core control in self care, mobility, lifting, ambulation and eccentric single leg control. [] UE / cervical: cervical, scapular, scapulothoracic and UE control with self care, reaching, carrying, lifting, house/yardwork, driving, computer work.      NMR and Therapeutic Activities:    [x] (21550 or 43949) Provided verbal/tactile cueing for activities related to improving balance, coordination, kinesthetic sense, posture, motor skill, proprioception and motor activation to allow for proper function of   [] LE: / Lumbar core, proximal hip and LE with self care and ADLs  [] UE / Cervical: cervical, postural, scapular, scapulothoracic and UE control with self care, carrying, lifting, driving, computer work.   [] (58885) Gait Re-education- Provided training and instruction to the patient for proper LE, core and proximal hip recruitment and positioning and eccentric body weight control with ambulation re-education including up and down stairs     Home Management Training / Self Care:  [] (67620) Provided self-care/home management training related to activities of daily living and compensatory training, and/or use of adaptive equipment for improvement with: ADLs and compensatory training, meal preparation, safety procedures and instruction in use of adaptive equipment, including bathing, grooming, dressing, personal hygiene, basic household cleaning and chores. Home Exercise Program:    [x] (98070) Reviewed/Progressed HEP activities related to strengthening, flexibility, endurance, ROM of   [] LE / Lumbar: core, proximal hip and LE for functional self-care, mobility, lifting and ambulation/stair navigation   [] UE / Cervical: cervical, postural, scapular, scapulothoracic and UE control with self care, reaching, carrying, lifting, house/yardwork, driving, computer work  [] (23585)Reviewed/Progressed HEP activities related to improving balance, coordination, kinesthetic sense, posture, motor skill, proprioception of   [] LE: core, proximal hip and LE for self care, mobility, lifting, and ambulation/stair navigation    [] UE / Cervical: cervical, postural,  scapular, scapulothoracic and UE control with self care, reaching, carrying, lifting, house/yardwork, driving, computer work    Manual Treatments:  PROM / STM / Oscillations-Mobs:  G-I, II, III, IV (PA's, Inf., Post.)  [] (84417) Provided manual therapy to mobilize LE, proximal hip and/or LS spine soft tissue/joints for the purpose of modulating pain, promoting relaxation,  increasing ROM, reducing/eliminating soft tissue swelling/inflammation/restriction, improving soft tissue extensibility and allowing for proper ROM for normal function with   [] LE / lumbar: self care, mobility, lifting and ambulation.     [] UE / Cervical: self care, reaching, carrying, lifting, house/yardwork, driving, computer work. Modalities:  [x] (83773) Vasopneumatic compression: Utilized vasopneumatic compression to decrease edema / swelling for the purpose of improving mobility and quad tone / recruitment which will allow for increased overall function including but not limited to self-care, transfers, ambulation, and ascending / descending stairs. Charges:  Timed Code Treatment Minutes: 44   Total Treatment Minutes: 54     [] EVAL - LOW (42534)   [] EVAL - MOD (77603)  [] EVAL - HIGH (74173)  [] RE-EVAL (03930)  [x] DI(52080) x 2      [] Ionto  [] NMR (80967) x       [x] Vaso  [x] Manual (73776) x  1     [] Ultrasound  [] TA x        [] Mech Traction (79910)  [] Aquatic Therapy x     [] ES (un) (14874):   [] Home Management Training x  [] ES(attended) (57544)   [] Dry Needling 1-2 muscles (53607):  [] Dry Needling 3+ muscles (732560)  [] Group:      [] Other:     GOALS:   Patient stated goal: to improve full range of motion,  []? Progressing: []? Met: []? Not Met: []? Adjusted     Therapist goals for Patient:   Short Term Goals: To be achieved in: 2 weeks  1. Independent in HEP and progression per patient tolerance, in order to prevent re-injury. []? Progressing: []? Met: []? Not Met: []? Adjusted  2. Patient will have a decrease in pain to facilitate improvement in movement, function, and ADLs as indicated by Functional Deficits. []? Progressing: []? Met: []? Not Met: []? Adjusted     Long Term Goals: To be achieved in: 7 weeks/ DC   1. Disability index score of 20% or less for the LEFS to assist with reaching prior level of function. []? Progressing: []? Met: []? Not Met: []? Adjusted  2. Patient will demonstrate increased AROM to right knee flexion to 120 deg and knee extension 0 deg to allow for proper joint functioning as indicated by patients Functional Deficits. []? Progressing: []? Met: []? Not Met: []? Adjusted  3.  Patient will demonstrate an increase in Strength to at least +4/5 as well as good proximal hip strength and control to allow for proper functional mobility as indicated by patients Functional Deficits. []? Progressing: []? Met: []? Not Met: []? Adjusted  4. Patient will return to functional activities including  ambulation without AD without increased symptoms or restriction. []? Progressing: []? Met: []? Not Met: []? Adjusted  5. Patient to be able to sleep through the night without symptoms . []? Progressing: []? Met: []? Not Met: []? Adjusted       Overall Progression Towards Functional goals/ Treatment Progress Update:  [] Patient is progressing as expected towards functional goals listed. [] Progression is slowed due to complexities/Impairments listed. [] Progression has been slowed due to co-morbidities. [x] Plan just implemented, too soon to assess goals progression <30days   [] Goals require adjustment due to lack of progress  [] Patient is not progressing as expected and requires additional follow up with physician  [] Other    Persisting Functional Limitations/Impairments:  []Sleeping []Sitting               []Standing []Transfers        [x]Walking []Kneeling               []Stairs [x]Squatting / bending   []ADLs []Reaching  []Lifting  []Housework  []Driving []Job related tasks  []Sports/Recreation []Other:        ASSESSMENT:  Fair toleration of session today, 1 instance per objective section with pt becoming significantly diaphoretic needing rest break to resolve. Pt continues with significant limitations with knee ROM with inability to tolerate manual overpressure post prior ROM measures due to pain. Progressing well with quad function with ability to carry out quad sets and SAQ/LAQ with no assistance needed. SLR differed today with progressions due to incident in supine and pt was not placed back in supine after. Pt will continue to benefit from PT to work on  Progressing knee ROM and quad strength and activation. Treatment/Activity Tolerance:  [] Patient able to complete tx  [x] Patient limited by fatigue  [x] Patient limited by pain  [] Patient limited by other medical complications  [] Other:     Prognosis: [] Good [] Fair  [] Poor    Patient Requires Follow-up: [x] Yes  [] No    Plan for next treatment session:      PLAN: See eval. PT 3x / week for  7 weeks. [x] Continue per plan of care [] Alter current plan (see comments)  [] Plan of care initiated [] Hold pending MD visit [] Discharge    Electronically signed by: Markus Cook, PT DPT, OMT-C    Note: If patient does not return for scheduled/ recommended follow up visits, this note will serve as a discharge from care along with most recent update on progress.

## 2021-08-27 ENCOUNTER — HOSPITAL ENCOUNTER (OUTPATIENT)
Dept: PHYSICAL THERAPY | Age: 61
Setting detail: THERAPIES SERIES
Discharge: HOME | End: 2021-08-27
Payer: COMMERCIAL

## 2021-08-27 PROCEDURE — 97110 THERAPEUTIC EXERCISES: CPT

## 2021-08-27 PROCEDURE — 97016 VASOPNEUMATIC DEVICE THERAPY: CPT

## 2021-08-27 PROCEDURE — 97140 MANUAL THERAPY 1/> REGIONS: CPT

## 2021-08-27 NOTE — FLOWSHEET NOTE
[address] [facility] Physical Therapy  Phone: [phone]   Fax: [phone]    Physical Therapy Daily Treatment Note  Date:  2021    Patient Name:  Jesus Jones    :  1960  MRN: [id number]  Medical/Treatment Diagnosis Information:  Diagnosis: M17.11 (ICD-10-CM) - Unilateral primary osteoarthritis, right knee, Z96.651 (ICD-10-CM) - Presence of right artificial knee joint  Treatment Diagnosis: Decrease knee AROM , difficulty walking, imbalance  Insurance/Certification information:  PT Insurance Information: [facility]  Physician Information:  Referring Practitioner: Afsaneh Xiao MD  Plan of care signed (Y/N): []  Yes [x]  No     Date of Patient follow up with Physician: 21     Progress Report: []  Yes  [x]  No     Date Range for reporting period:  Beginnin21  Ending:     Progress report due (10 Rx/or 30 days whichever is less): visit #36      Recertification due (POC duration/ or 90 days whichever is less): visit # 21    Visit # Insurance Allowable Auth required? Date Range    Mn []  Yes  [x]  No NA       Latex Allergy:  [x]NO      []YES  Preferred Language for Healthcare:   [x]English       []other:    Functional Scale:        Date assessed:  LEFS: raw score =15 /80; dysfunction = 81%  21    Pain level:  6-8/10     SUBJECTIVE:  Patient reports that he is trying to adjust to the reality of not having second knee surgery in November. Says his pain this morning is about 6/10. States he really wants to work on the ROM. Pt reports he has been doing ok, knee still higher on pain level scale on regular basis, still increases with activity. Pain medication on a consistent basis as well due to higher pain levels. Working on knee ROM at home, able to get to 90 degrees but does not rest that way. OBJECTIVE:   : AROM knee ext 0 with quad set, knee flexion 76 degrees with heel slide and overpressure.  Replaced top section of Tegaderm bandage due to rolling and falling off   -During session today in supine, pt was able to tolerate ROM and transition to strength activity however became significantly diaphoretic and short of breath transitioning to sitting given cold water to drink and towel. All symptoms resolved within 5 mins. 8/24/21:EOB A/A R Knee Flexion: 76 degrees     Not  prehab just using values as baseline   Functional testing Pre hab        Date   eval post op   Date 8/20/21 4 week f/u   Date  8 week f/u  Date D/c            TUG  TBD      30 second sit to stand  TBD      6 minute walk  244      Balance        Narrow NIMCO  30s      Semi tandem        Tandem   R 30s, L 24s      SLS        Knee AROM  Flex 72, ext +2      Knee Extension MMT R/L  -3      Hip aBduction MMT R/L  -3      LEFS                      RESTRICTIONS/PRECAUTIONS:  B PRINCESS     Exercises/Interventions:     Therapeutic Exercises (49722) Resistance / level Sets/sec Reps Notes   Bike/ Nustep  4 min  Half reciprocol   EOB A/A Flexion/Ext   5\"H  10x  8/24-added    Long Sit Calf Stretch with Strap   30\"  3x 8/24-added    IB  HSS supine or seated       Standing HR   8/24-added    Quad sets  5\"H  15x     Heel slides  10\"H / 2 sets 5x     ERMI       Bridging        Standing [address]   8/24-added   LAQ  2 10 8/27 updated                  Therapeutic Activities (13794)                                          Neuromuscular Re-ed (04006)       SAQ  5\"H  10x  8/24-added                                       Manual Intervention (77862)       Patella mobs  4'     Tibia/fib mobs/ distraction EOB  6'                                     Modalities: 8/27, 8/26, 8/24: VASO x 10' mod pressure in long sit position    Pt. Education:  -patient educated on diagnosis, prognosis and expectations for rehab  -all patient questions were answered    Home Exercise Program:  Access Code: [email or web address]  URL: [email or web address]  Date: 08/20/2021  Prepared by: Linette Ricketts    Exercises  Supine Quad Set - 1 x daily - 7 x weekly - 3 sets - 10 reps  Supine Heel Slide with Strap - 1 x daily - 7 x weekly - 3 sets - 10 reps - 5 hold  Supine Hip Abduction - 1 x daily - 7 x weekly - 3 sets - 10 reps  Supine Bridge - 1 x daily - 7 x weekly - 3 sets - 10 reps        Therapeutic Exercise and NMR EXR  [x] (16073) Provided verbal/tactile cueing for activities related to strengthening, flexibility, endurance, ROM for improvements in  [x] LE / Lumbar: LE, proximal hip, and core control with self care, mobility, lifting, ambulation. [] UE / Cervical: cervical, postural, scapular, scapulothoracic and UE control with self care, reaching, carrying, lifting, house/yardwork, driving, computer work.  [] (99589) Provided verbal/tactile cueing for activities related to improving balance, coordination, kinesthetic sense, posture, motor skill, proprioception to assist with   [] LE / lumbar: LE, proximal hip, and core control in self care, mobility, lifting, ambulation and eccentric single leg control. [] UE / cervical: cervical, scapular, scapulothoracic and UE control with self care, reaching, carrying, lifting, house/yardwork, driving, computer work.   [] (09266) Therapist is in constant attendance of 2 or more patients providing skilled therapy interventions, but not providing any significant amount of measurable one-on-one time to either patient, for improvements in  [] LE / lumbar: LE, proximal hip, and core control in self care, mobility, lifting, ambulation and eccentric single leg control. [] UE / cervical: cervical, scapular, scapulothoracic and UE control with self care, reaching, carrying, lifting, house/yardwork, driving, computer work.      NMR and Therapeutic Activities:    [x] (76106 or 00952) Provided verbal/tactile cueing for activities related to improving balance, coordination, kinesthetic sense, posture, motor skill, proprioception and motor activation to allow for proper function of   [] LE: / Lumbar core, proximal hip and LE with self care and ADLs  [] UE / Cervical: cervical, postural, scapular, scapulothoracic and UE control with self care, carrying, lifting, driving, computer work.   [] (49513) Gait Re-education- Provided training and instruction to the patient for proper LE, core and proximal hip recruitment and positioning and eccentric body weight control with ambulation re-education including up and down stairs     Home Management Training / Self Care:  [] (80229) Provided self-care/home management training related to activities of daily living and compensatory training, and/or use of adaptive equipment for improvement with: ADLs and compensatory training, meal preparation, safety procedures and instruction in use of adaptive equipment, including bathing, grooming, dressing, personal hygiene, basic household cleaning and chores.      Home Exercise Program:    [x] (26139) Reviewed/Progressed HEP activities related to strengthening, flexibility, endurance, ROM of   [] LE / Lumbar: core, proximal hip and LE for functional self-care, mobility, lifting and ambulation/stair navigation   [] UE / Cervical: cervical, postural, scapular, scapulothoracic and UE control with self care, reaching, carrying, lifting, house/yardwork, driving, computer work  [] (94073)Reviewed/Progressed HEP activities related to improving balance, coordination, kinesthetic sense, posture, motor skill, proprioception of   [] LE: core, proximal hip and LE for self care, mobility, lifting, and ambulation/stair navigation    [] UE / Cervical: cervical, postural,  scapular, scapulothoracic and UE control with self care, reaching, carrying, lifting, house/yardwork, driving, computer work    Manual Treatments:  PROM / STM / Oscillations-Mobs:  G-I, II, III, IV (PA's, Inf., Post.)  [] (49235) Provided manual therapy to mobilize LE, proximal hip and/or LS spine soft tissue/joints for the purpose of modulating pain, promoting relaxation,  increasing ROM, reducing/eliminating soft tissue swelling/inflammation/restriction, improving soft tissue extensibility and allowing for proper ROM for normal function with   [] LE / lumbar: self care, mobility, lifting and ambulation. [] UE / Cervical: self care, reaching, carrying, lifting, house/yardwork, driving, computer work. Modalities:  [x] (18199) Vasopneumatic compression: Utilized vasopneumatic compression to decrease edema / swelling for the purpose of improving mobility and quad tone / recruitment which will allow for increased overall function including but not limited to self-care, transfers, ambulation, and ascending / descending stairs. Charges:  Timed Code Treatment Minutes: 43   Total Treatment Minutes: 53     [] EVAL - LOW (40525)   [] EVAL - MOD (49010)  [] EVAL - HIGH (57023)  [] RE-EVAL (94783)  [x] EN(38521) x 2      [] Ionto  [] NMR (09939) x       [x] Vaso  [x] Manual (05057) x  1     [] Ultrasound  [] TA x        [] Mech Traction (02224)  [] Aquatic Therapy x     [] ES (un) (30759):   [] Home Management Training x  [] ES(attended) (90749)   [] Dry Needling 1-2 muscles (53430):  [] Dry Needling 3+ muscles (678698)  [] Group:      [] Other:     GOALS:   Patient stated goal: to improve full range of motion,  []? Progressing: []? Met: []? Not Met: []? Adjusted     Therapist goals for Patient:   Short Term Goals: To be achieved in: 2 weeks  1. Independent in HEP and progression per patient tolerance, in order to prevent re-injury. []? Progressing: []? Met: []? Not Met: []? Adjusted  2. Patient will have a decrease in pain to facilitate improvement in movement, function, and ADLs as indicated by Functional Deficits. []? Progressing: []? Met: []? Not Met: []? Adjusted     Long Term Goals: To be achieved in: 7 weeks/ DC   1. Disability index score of 20% or less for the LEFS to assist with reaching prior level of function. []? Progressing: []? Met: []? Not Met: []? Adjusted  2.  Patient will demonstrate increased AROM to right knee flexion to 120 deg and knee extension 0 deg to allow for proper joint functioning as indicated by patients Functional Deficits. []? Progressing: []? Met: []? Not Met: []? Adjusted  3. Patient will demonstrate an increase in Strength to at least +4/5 as well as good proximal hip strength and control to allow for proper functional mobility as indicated by patients Functional Deficits. []? Progressing: []? Met: []? Not Met: []? Adjusted  4. Patient will return to functional activities including  ambulation without AD without increased symptoms or restriction. []? Progressing: []? Met: []? Not Met: []? Adjusted  5. Patient to be able to sleep through the night without symptoms . []? Progressing: []? Met: []? Not Met: []? Adjusted       Overall Progression Towards Functional goals/ Treatment Progress Update:  [] Patient is progressing as expected towards functional goals listed. [] Progression is slowed due to complexities/Impairments listed. [] Progression has been slowed due to co-morbidities. [x] Plan just implemented, too soon to assess goals progression <30days   [] Goals require adjustment due to lack of progress  [] Patient is not progressing as expected and requires additional follow up with physician  [] Other    Persisting Functional Limitations/Impairments:  []Sleeping []Sitting               []Standing []Transfers        [x]Walking []Kneeling               []Stairs [x]Squatting / bending   []ADLs []Reaching  []Lifting  []Housework  []Driving []Job related tasks  []Sports/Recreation []Other:        ASSESSMENT: Fair to good toleration of treatment session today. Patient became dizzy during heel slide exercise. Added increase reps with LAQ. Patient seems to break out in sweat being diaphoretic and is challenged by supine exercises.  Will progress quad strengthening and AROM as tolerated      Fair toleration of session today, 1 instance per objective section with pt becoming significantly diaphoretic needing rest break to resolve. Pt continues with significant limitations with knee ROM with inability to tolerate manual overpressure post prior ROM measures due to pain. Progressing well with quad function with ability to carry out quad sets and SAQ/LAQ with no assistance needed. SLR differed today with progressions due to incident in supine and pt was not placed back in supine after. Pt will continue to benefit from PT to work on  Progressing knee ROM and quad strength and activation. Treatment/Activity Tolerance:  [] Patient able to complete tx  [x] Patient limited by fatigue  [x] Patient limited by pain  [] Patient limited by other medical complications  [] Other:     Prognosis: [] Good [] Fair  [] Poor    Patient Requires Follow-up: [x] Yes  [] No    Plan for next treatment session:      PLAN: See eval. PT 3x / week for  7 weeks. [x] Continue per plan of care [] Alter current plan (see comments)  [] Plan of care initiated [] Hold pending MD visit [] Discharge    Electronically signed by: Bev Flowers PT DPT, OMT-C    Note: If patient does not return for scheduled/ recommended follow up visits, this note will serve as a discharge from care along with most recent update on progress.

## 2021-08-30 ENCOUNTER — HOSPITAL ENCOUNTER (OUTPATIENT)
Dept: PHYSICAL THERAPY | Age: 61
Setting detail: THERAPIES SERIES
Discharge: HOME | End: 2021-08-30
Payer: COMMERCIAL

## 2021-08-30 PROCEDURE — 97110 THERAPEUTIC EXERCISES: CPT

## 2021-08-30 PROCEDURE — 97016 VASOPNEUMATIC DEVICE THERAPY: CPT

## 2021-08-30 NOTE — FLOWSHEET NOTE
8/26: AROM knee ext 0 with quad set, knee flexion 76 degrees with heel slide and overpressure. Replaced top section of Tegaderm bandage due to rolling and falling off   -During session today in supine, pt was able to tolerate ROM and transition to strength activity however became significantly diaphoretic and short of breath transitioning to sitting given cold water to drink and towel. All symptoms resolved within 5 mins. 8/24/21:EOB A/A R Knee Flexion: 76 degrees     Not  prehab just using values as baseline   Functional testing Pre hab        Date   eval post op   Date 8/20/21 4 week f/u   Date  8 week f/u  Date D/c            TUG  TBD      30 second sit to stand  TBD      6 minute walk  244      Balance        Narrow NIMCO  30s      Semi tandem        Tandem   R 30s, L 24s      SLS        Knee AROM  Flex 72, ext +2      Knee Extension MMT R/L  -3      Hip aBduction MMT R/L  -3      LEFS                      RESTRICTIONS/PRECAUTIONS:  B PRINCESS     Exercises/Interventions:     Therapeutic Exercises (91491) Resistance / level Sets/sec Reps Notes   Bike/ Nustep  4 min  Half reciprocol   EOB A/A Flexion/Ext   5\"H  10x  8/24-added    Long Sit Calf Stretch with Strap   30\"  3x 8/24-added    IB  HSS supine or seated       Standing HR   8/24-added    Quad sets  5\"H  15x     Heel slides  10\"H / 2 sets 5x     ERMI  5s H 8x 8/30 added    Bridging     8/30 added    Standing [address]   8/24-added   LAQ  2 10 8/27 updated    SLR ecc 5 AAROM ; conc 5   5x/5x 8/30 added           Therapeutic Activities (05946)                                          Neuromuscular Re-ed (80081)       SAQ  5\"H  10x  8/24-added                                       Manual Intervention (23045)       Patella mobs  5'     Tibia/fib mobs/ distraction EOB                                       Modalities: 8/30,8/27, 8/26, 8/24: VASO x 10' mod pressure in long sit position    Pt.  Education:  -patient educated on diagnosis, prognosis and expectations for rehab  -all patient questions were answered    Home Exercise Program:  Access Code: [email or web address]  URL: [email or web address]  Date: 08/20/2021  Prepared by: Giorgio Poole    Exercises  Supine Quad Set - 1 x daily - 7 x weekly - 3 sets - 10 reps  Supine Heel Slide with Strap - 1 x daily - 7 x weekly - 3 sets - 10 reps - 5 hold  Supine Hip Abduction - 1 x daily - 7 x weekly - 3 sets - 10 reps  Supine Bridge - 1 x daily - 7 x weekly - 3 sets - 10 reps        Therapeutic Exercise and NMR EXR  [x] (03773) Provided verbal/tactile cueing for activities related to strengthening, flexibility, endurance, ROM for improvements in  [x] LE / Lumbar: LE, proximal hip, and core control with self care, mobility, lifting, ambulation. [] UE / Cervical: cervical, postural, scapular, scapulothoracic and UE control with self care, reaching, carrying, lifting, house/yardwork, driving, computer work.  [] (31329) Provided verbal/tactile cueing for activities related to improving balance, coordination, kinesthetic sense, posture, motor skill, proprioception to assist with   [] LE / lumbar: LE, proximal hip, and core control in self care, mobility, lifting, ambulation and eccentric single leg control. [] UE / cervical: cervical, scapular, scapulothoracic and UE control with self care, reaching, carrying, lifting, house/yardwork, driving, computer work.   [] (34679) Therapist is in constant attendance of 2 or more patients providing skilled therapy interventions, but not providing any significant amount of measurable one-on-one time to either patient, for improvements in  [] LE / lumbar: LE, proximal hip, and core control in self care, mobility, lifting, ambulation and eccentric single leg control. [] UE / cervical: cervical, scapular, scapulothoracic and UE control with self care, reaching, carrying, lifting, house/yardwork, driving, computer work.      NMR and Therapeutic Activities:    [x] (47980 or 04875) Provided verbal/tactile cueing for activities related to improving balance, coordination, kinesthetic sense, posture, motor skill, proprioception and motor activation to allow for proper function of   [] LE: / Lumbar core, proximal hip and LE with self care and ADLs  [] UE / Cervical: cervical, postural, scapular, scapulothoracic and UE control with self care, carrying, lifting, driving, computer work.   [] (16649) Gait Re-education- Provided training and instruction to the patient for proper LE, core and proximal hip recruitment and positioning and eccentric body weight control with ambulation re-education including up and down stairs     Home Management Training / Self Care:  [] (91328) Provided self-care/home management training related to activities of daily living and compensatory training, and/or use of adaptive equipment for improvement with: ADLs and compensatory training, meal preparation, safety procedures and instruction in use of adaptive equipment, including bathing, grooming, dressing, personal hygiene, basic household cleaning and chores.      Home Exercise Program:    [x] (26544) Reviewed/Progressed HEP activities related to strengthening, flexibility, endurance, ROM of   [] LE / Lumbar: core, proximal hip and LE for functional self-care, mobility, lifting and ambulation/stair navigation   [] UE / Cervical: cervical, postural, scapular, scapulothoracic and UE control with self care, reaching, carrying, lifting, house/yardwork, driving, computer work  [] (39999)Reviewed/Progressed HEP activities related to improving balance, coordination, kinesthetic sense, posture, motor skill, proprioception of   [] LE: core, proximal hip and LE for self care, mobility, lifting, and ambulation/stair navigation    [] UE / Cervical: cervical, postural,  scapular, scapulothoracic and UE control with self care, reaching, carrying, lifting, house/yardwork, driving, computer work    Manual Treatments:  PROM / STM / Oscillations-Mobs:  G-I, II, III, IV (Bia, Inf., Post.)  [] (28175) Provided manual therapy to mobilize LE, proximal hip and/or LS spine soft tissue/joints for the purpose of modulating pain, promoting relaxation,  increasing ROM, reducing/eliminating soft tissue swelling/inflammation/restriction, improving soft tissue extensibility and allowing for proper ROM for normal function with   [] LE / lumbar: self care, mobility, lifting and ambulation. [] UE / Cervical: self care, reaching, carrying, lifting, house/yardwork, driving, computer work. Modalities:  [x] (57705) Vasopneumatic compression: Utilized vasopneumatic compression to decrease edema / swelling for the purpose of improving mobility and quad tone / recruitment which will allow for increased overall function including but not limited to self-care, transfers, ambulation, and ascending / descending stairs. Charges:  Timed Code Treatment Minutes: 44   Total Treatment Minutes: 55     [] EVAL - LOW (10628)   [] EVAL - MOD (23045)  [] EVAL - HIGH (56645)  [] RE-EVAL (13759)  [x] OT(25481) x 3     [] Ionto  [] NMR (35528) x       [x] Vaso  [] Manual (85952) x       [] Ultrasound  [] TA x        [] Mech Traction (41654)  [] Aquatic Therapy x     [] ES (un) (53294):   [] Home Management Training x  [] ES(attended) (48453)   [] Dry Needling 1-2 muscles (07432):  [] Dry Needling 3+ muscles (384721)  [] Group:      [] Other:     GOALS:   Patient stated goal: to improve full range of motion,  []? Progressing: []? Met: []? Not Met: []? Adjusted     Therapist goals for Patient:   Short Term Goals: To be achieved in: 2 weeks  1. Independent in HEP and progression per patient tolerance, in order to prevent re-injury. []? Progressing: []? Met: []? Not Met: []? Adjusted  2. Patient will have a decrease in pain to facilitate improvement in movement, function, and ADLs as indicated by Functional Deficits. []? Progressing: []? Met: []? Not Met: []? Adjusted     Long Term Goals:  To be achieved in: 7 weeks/ DC   1. Disability index score of 20% or less for the LEFS to assist with reaching prior level of function. []? Progressing: []? Met: []? Not Met: []? Adjusted  2. Patient will demonstrate increased AROM to right knee flexion to 120 deg and knee extension 0 deg to allow for proper joint functioning as indicated by patients Functional Deficits. []? Progressing: []? Met: []? Not Met: []? Adjusted  3. Patient will demonstrate an increase in Strength to at least +4/5 as well as good proximal hip strength and control to allow for proper functional mobility as indicated by patients Functional Deficits. []? Progressing: []? Met: []? Not Met: []? Adjusted  4. Patient will return to functional activities including  ambulation without AD without increased symptoms or restriction. []? Progressing: []? Met: []? Not Met: []? Adjusted  5. Patient to be able to sleep through the night without symptoms . []? Progressing: []? Met: []? Not Met: []? Adjusted       Overall Progression Towards Functional goals/ Treatment Progress Update:  [] Patient is progressing as expected towards functional goals listed. [] Progression is slowed due to complexities/Impairments listed. [] Progression has been slowed due to co-morbidities. [x] Plan just implemented, too soon to assess goals progression <30days   [] Goals require adjustment due to lack of progress  [] Patient is not progressing as expected and requires additional follow up with physician  [] Other    Persisting Functional Limitations/Impairments:  []Sleeping []Sitting               []Standing []Transfers        [x]Walking []Kneeling               []Stairs [x]Squatting / bending   []ADLs []Reaching  []Lifting  []Housework  []Driving []Job related tasks  []Sports/Recreation []Other:        ASSESSMENT:  Patient demonstrated good tolerance with session today. Added  ERMI for knee flexion AROM.  Patient able to increase flexion up to 80 deg during exercises. Patient performed SLR concentrically after 5x reps of eccentric facilitation. He is still having swelling and stiffness evident during terminal knee extension. Will further progress AAROM and AROM of right and Quad control as tolerated. .     Fair to good toleration of treatment session today. Patient became dizzy during heel slide exercise. Added increase reps with LAQ. Patient seems to break out in sweat being diaphoretic and is challenged by supine exercises. Will progress quad strengthening and AROM as tolerated      Fair toleration of session today, 1 instance per objective section with pt becoming significantly diaphoretic needing rest break to resolve. Pt continues with significant limitations with knee ROM with inability to tolerate manual overpressure post prior ROM measures due to pain. Progressing well with quad function with ability to carry out quad sets and SAQ/LAQ with no assistance needed. SLR differed today with progressions due to incident in supine and pt was not placed back in supine after. Pt will continue to benefit from PT to work on  Progressing knee ROM and quad strength and activation. Treatment/Activity Tolerance:  [] Patient able to complete tx  [x] Patient limited by fatigue  [x] Patient limited by pain  [] Patient limited by other medical complications  [] Other:     Prognosis: [] Good [] Fair  [] Poor    Patient Requires Follow-up: [x] Yes  [] No    Plan for next treatment session:      PLAN: See eval. PT 3x / week for  7 weeks. [x] Continue per plan of care [] Alter current plan (see comments)  [] Plan of care initiated [] Hold pending MD visit [] Discharge    Electronically signed by: Suzanne Aquino, PT DPT, OMT-C    Note: If patient does not return for scheduled/ recommended follow up visits, this note will serve as a discharge from care along with most recent update on progress.

## 2021-09-01 ENCOUNTER — HOSPITAL ENCOUNTER (OUTPATIENT)
Dept: PHYSICAL THERAPY | Age: 61
Setting detail: THERAPIES SERIES
Discharge: HOME | End: 2021-09-01
Payer: COMMERCIAL

## 2021-09-01 PROCEDURE — 97110 THERAPEUTIC EXERCISES: CPT

## 2021-09-01 PROCEDURE — 97016 VASOPNEUMATIC DEVICE THERAPY: CPT

## 2021-09-01 NOTE — FLOWSHEET NOTE
[address] [facility]  Phone: [phone]   Fax: [phone]    Physical Therapy Daily Treatment Note  Date:  2021    Patient Name:  Myles Kruger    :  1960  MRN: [id number]  Medical/Treatment Diagnosis Information:  Diagnosis: M17.11 (ICD-10-CM) - Unilateral primary osteoarthritis, right knee, Z96.651 (ICD-10-CM) - Presence of right artificial knee joint  Treatment Diagnosis: Decrease knee AROM , difficulty walking, imbalance  Insurance/Certification information:  PT Insurance Information: [facility]  Physician Information:  Referring Practitioner: Olive Dumont MD  Plan of care signed (Y/N): []  Yes [x]  No     Date of Patient follow up with Physician: 21     Progress Report: []  Yes  [x]  No     Date Range for reporting period:  Beginnin21  Ending:     Progress report due (10 Rx/or 30 days whichever is less): visit #06      Recertification due (POC duration/ or 90 days whichever is less): visit # 21    Visit # Insurance Allowable Auth required? Date Range    Mn []  Yes  [x]  No NA       Latex Allergy:  [x]NO      []YES  Preferred Language for Healthcare:   [x]English       []other:    Functional Scale:        Date assessed:  LEFS: raw score =15 /80; dysfunction = 81%  21    Pain level:  6-8/10     SUBJECTIVE:   Patient reports that he is a  Little stiff this morning, but feels like he is having more motion in knee. Says that takes a lot of supplements to assist with he overall health . Chana Marrero OBJECTIVE:   21: ERMI R Knee Flexion: 80 degrees; R Knee Extension w/ quad set +3 degrees    : AROM knee ext 0 with quad set, knee flexion 76 degrees with heel slide and overpressure.  Replaced top section of Tegaderm bandage due to rolling and falling off   -During session today in supine, pt was able to tolerate ROM and transition to strength activity however became significantly diaphoretic and short of breath transitioning to sitting given cold water to drink and towel. All symptoms resolved within 5 mins. 8/24/21:EOB A/A R Knee Flexion: 76 degrees       Not  prehab just using values as baseline   Functional testing Pre hab        Date   eval post op   Date 8/20/21 4 week f/u   Date  8 week f/u  Date D/c            TUG  TBD      30 second sit to stand  TBD      6 minute walk  244      Balance        Narrow NIMCO  30s      Semi tandem        Tandem   R 30s, L 24s      SLS        Knee AROM  Flex 72, ext +2      Knee Extension MMT R/L  -3      Hip aBduction MMT R/L  -3      LEFS                      RESTRICTIONS/PRECAUTIONS:  B PRINCESS     Exercises/Interventions:     Therapeutic Exercises (21958) Resistance / level Sets/sec Reps Notes   Bike/ Nustep  4 min  Half reciprocol   EOB A/A Flexion/Ext   5\"H  10x  8/24-added    Long Sit Calf Stretch with Strap   30\"  3x 8/24-added    IB  HSS supine or seated       Standing HR   8/24-added    Quad sets  5\"H  15x     Heel slides with strap  2 sets x 10    ERMI  5s H 10x 8/30 added    Bridging     8/30 added    Standing [address]   8/24-added   LAQ  8/27 updated    SLR ecc 5 AAROM ; conc 5   5x/5x 8/30 added           Therapeutic Activities (24168)                                          Neuromuscular Re-ed (15330)       SAQ  5\"H  10x  8/24-added    Consider gait training with cane NPV                                   Manual Intervention (32702)       Patella mobs       Tibia/fib mobs/ distraction EOB                                       Modalities: 9/1, 8/30,8/27, 8/26, 8/24: VASO x 10' mod pressure in long sit position    Pt. Education:  -patient educated on diagnosis, prognosis and expectations for rehab  -all patient questions were answered    Home Exercise Program:  Access Code: [email or web address]  URL: [email or web address]  Date: 08/20/2021  Prepared by: Noe Rodriguez    Exercises  Supine Quad Set - 1 x daily - 7 x weekly - 3 sets - 10 reps  Supine Heel Slide with Strap - 1 x daily - 7 x weekly - 3 sets - 10 reps - 5 hold  Supine Hip Abduction - 1 x daily - 7 x weekly - 3 sets - 10 reps  Supine Bridge - 1 x daily - 7 x weekly - 3 sets - 10 reps        Therapeutic Exercise and NMR EXR  [x] (20206) Provided verbal/tactile cueing for activities related to strengthening, flexibility, endurance, ROM for improvements in  [x] LE / Lumbar: LE, proximal hip, and core control with self care, mobility, lifting, ambulation. [] UE / Cervical: cervical, postural, scapular, scapulothoracic and UE control with self care, reaching, carrying, lifting, house/yardwork, driving, computer work.  [] (01201) Provided verbal/tactile cueing for activities related to improving balance, coordination, kinesthetic sense, posture, motor skill, proprioception to assist with   [] LE / lumbar: LE, proximal hip, and core control in self care, mobility, lifting, ambulation and eccentric single leg control. [] UE / cervical: cervical, scapular, scapulothoracic and UE control with self care, reaching, carrying, lifting, house/yardwork, driving, computer work.   [] (09463) Therapist is in constant attendance of 2 or more patients providing skilled therapy interventions, but not providing any significant amount of measurable one-on-one time to either patient, for improvements in  [] LE / lumbar: LE, proximal hip, and core control in self care, mobility, lifting, ambulation and eccentric single leg control. [] UE / cervical: cervical, scapular, scapulothoracic and UE control with self care, reaching, carrying, lifting, house/yardwork, driving, computer work.      NMR and Therapeutic Activities:    [x] (87768 or 38359) Provided verbal/tactile cueing for activities related to improving balance, coordination, kinesthetic sense, posture, motor skill, proprioception and motor activation to allow for proper function of   [] LE: / Lumbar core, proximal hip and LE with self care and ADLs  [] UE / Cervical: cervical, postural, scapular, scapulothoracic and UE control with self care, carrying, lifting, driving, computer work.   [] (53483) Gait Re-education- Provided training and instruction to the patient for proper LE, core and proximal hip recruitment and positioning and eccentric body weight control with ambulation re-education including up and down stairs     Home Management Training / Self Care:  [] (22759) Provided self-care/home management training related to activities of daily living and compensatory training, and/or use of adaptive equipment for improvement with: ADLs and compensatory training, meal preparation, safety procedures and instruction in use of adaptive equipment, including bathing, grooming, dressing, personal hygiene, basic household cleaning and chores.      Home Exercise Program:    [x] (42438) Reviewed/Progressed HEP activities related to strengthening, flexibility, endurance, ROM of   [] LE / Lumbar: core, proximal hip and LE for functional self-care, mobility, lifting and ambulation/stair navigation   [] UE / Cervical: cervical, postural, scapular, scapulothoracic and UE control with self care, reaching, carrying, lifting, house/yardwork, driving, computer work  [] (86481)Reviewed/Progressed HEP activities related to improving balance, coordination, kinesthetic sense, posture, motor skill, proprioception of   [] LE: core, proximal hip and LE for self care, mobility, lifting, and ambulation/stair navigation    [] UE / Cervical: cervical, postural,  scapular, scapulothoracic and UE control with self care, reaching, carrying, lifting, house/yardwork, driving, computer work    Manual Treatments:  PROM / STM / Oscillations-Mobs:  G-I, II, III, IV (PA's, Inf., Post.)  [] (42273) Provided manual therapy to mobilize LE, proximal hip and/or LS spine soft tissue/joints for the purpose of modulating pain, promoting relaxation,  increasing ROM, reducing/eliminating soft tissue swelling/inflammation/restriction, improving soft tissue extensibility and allowing for proper ROM for normal function with   [] LE / lumbar: self care, mobility, lifting and ambulation. [] UE / Cervical: self care, reaching, carrying, lifting, house/yardwork, driving, computer work. Modalities:  [x] (63351) Vasopneumatic compression: Utilized vasopneumatic compression to decrease edema / swelling for the purpose of improving mobility and quad tone / recruitment which will allow for increased overall function including but not limited to self-care, transfers, ambulation, and ascending / descending stairs. Charges:  Timed Code Treatment Minutes: 43   Total Treatment Minutes: 54     [] EVAL - LOW (11192)   [] EVAL - MOD (31161)  [] EVAL - HIGH (68308)  [] RE-EVAL (04071)  [x] CL(74533) x 3     [] Ionto  [] NMR (14439) x       [x] Vaso  [] Manual (34477) x       [] Ultrasound  [] TA x        [] Mech Traction (20219)  [] Aquatic Therapy x     [] ES (un) (98122):   [] Home Management Training x  [] ES(attended) (20890)   [] Dry Needling 1-2 muscles (46837):  [] Dry Needling 3+ muscles (268962)  [] Group:      [] Other:     GOALS:   Patient stated goal: to improve full range of motion,  []? Progressing: []? Met: []? Not Met: []? Adjusted     Therapist goals for Patient:   Short Term Goals: To be achieved in: 2 weeks  1. Independent in HEP and progression per patient tolerance, in order to prevent re-injury. []? Progressing: []? Met: []? Not Met: []? Adjusted  2. Patient will have a decrease in pain to facilitate improvement in movement, function, and ADLs as indicated by Functional Deficits. []? Progressing: []? Met: []? Not Met: []? Adjusted     Long Term Goals: To be achieved in: 7 weeks/ DC   1. Disability index score of 20% or less for the LEFS to assist with reaching prior level of function. []? Progressing: []? Met: []? Not Met: []? Adjusted  2.  Patient will demonstrate increased AROM to right knee flexion to 120 deg and knee extension 0 deg to allow for proper joint functioning. Treatment/Activity Tolerance:  [] Patient able to complete tx  [x] Patient limited by fatigue  [x] Patient limited by pain  [] Patient limited by other medical complications  [] Other:     Prognosis: [] Good [] Fair  [] Poor    Patient Requires Follow-up: [x] Yes  [] No    Plan for next treatment session:      PLAN: See eval. PT 3x / week for  7 weeks. [x] Continue per plan of care [] Alter current plan (see comments)  [] Plan of care initiated [] Hold pending MD visit [] Discharge    Electronically signed by: Cornelius Holley, PT DPT, OMT-C    Note: If patient does not return for scheduled/ recommended follow up visits, this note will serve as a discharge from care along with most recent update on progress.

## 2021-09-03 ENCOUNTER — HOSPITAL ENCOUNTER (OUTPATIENT)
Dept: PHYSICAL THERAPY | Age: 61
Setting detail: THERAPIES SERIES
Discharge: HOME | End: 2021-09-03
Payer: COMMERCIAL

## 2021-09-03 PROCEDURE — 97140 MANUAL THERAPY 1/> REGIONS: CPT

## 2021-09-03 PROCEDURE — 97530 THERAPEUTIC ACTIVITIES: CPT

## 2021-09-03 PROCEDURE — 97110 THERAPEUTIC EXERCISES: CPT

## 2021-09-03 NOTE — FLOWSHEET NOTE
[facility]  Phone: [phone]   Fax: [phone]    Physical Therapy Daily Treatment Note  Date:  9/3/2021    Patient Name:  Pedro Diaz    :  1960  MRN: [id number]  Medical/Treatment Diagnosis Information:  Diagnosis: M17.11 (ICD-10-CM) - Unilateral primary osteoarthritis, right knee, Z96.651 (ICD-10-CM) - Presence of right artificial knee joint  DOS: 21  Treatment Diagnosis: Decrease knee AROM , difficulty walking, imbalance  Insurance/Certification information:  PT Insurance Information: [facility]  Physician Information:  Referring Practitioner: Cristine Pace MD  Plan of care signed (Y/N): []  Yes [x]  No     Date of Patient follow up with Physician: 21     Progress Report: []  Yes  [x]  No     Date Range for reporting period:  Beginnin21  Ending:     Progress report due (10 Rx/or 30 days whichever is less): visit #54      Recertification due (POC duration/ or 90 days whichever is less): visit # 21    Visit # Insurance Allowable Auth required? Date Range    Mn []  Yes  [x]  No NA       Latex Allergy:  [x]NO      []YES  Preferred Language for Healthcare:   [x]English       []other:    Functional Scale:        Date assessed:  LEFS: raw score =15 /80; dysfunction = 81%  21    Pain level:  5-6/10     SUBJECTIVE:   Patient reports that he is stiff this morning. He was considering having his other knee done in November, but now is thinking he will push it back. OBJECTIVE:   9/3/21: PROM: 84 deg    21: ERMI R Knee Flexion: 80 degrees; R Knee Extension w/ quad set +3 degrees    : AROM knee ext 0 with quad set, knee flexion 76 degrees with heel slide and overpressure.  Replaced top section of Tegaderm bandage due to rolling and falling off   -During session today in supine, pt was able to tolerate ROM and transition to strength activity however became significantly diaphoretic and short of breath transitioning to sitting given cold water to drink and towel. All symptoms resolved within 5 mins. 8/24/21:EOB A/A R Knee Flexion: 76 degrees       Not  prehab just using values as baseline   Functional testing Pre hab        Date   eval post op   Date 8/20/21 4 week f/u   9/14 8 week f/u  10/12 D/c            TUG  TBD      30 second sit to stand  TBD      6 minute walk  244      Balance        Narrow NIMCO  30s      Semi tandem        Tandem   R 30s, L 24s      SLS        Knee AROM  Flex 72, ext +2      Knee Extension MMT R/L  -3      Hip aBduction MMT R/L  -3      LEFS                      RESTRICTIONS/PRECAUTIONS:  B PRINCESS     Exercises/Interventions:     Therapeutic Exercises (12622) Resistance / level Sets/sec Reps Notes   Bike/ Nustep  4 min  Half reciprocol   EOB A/A Flexion  30\" 4 8/24-added    Long Sit Calf Stretch with Strap   8/24-added    IB  HSS supine or seated       Standing HR   8/24-added    Quad sets  5\"H  15x     Heel slides with strap  2 sets x 10    ERMI  30\" 6 8/30 added    Bridging     8/30 added    Standing [address]   8/24-added   LAQ  8/27 updated    SLR ecc 5 AAROM ; conc 5    8/30 added    Knee flexion lunge stretch on step  30\" 4 9/3                        Therapeutic Activities (25614)       Dressing change  5 min  9/3   Gait ed with RW, for upright posture, toe off, and flexion in swing phase  3 min  9/3                        Neuromuscular Re-ed (75330)       SAQ  8/24-added                                       Manual Intervention (22677)       Patella mobs  2 min     Tibia/fib mobs/ distraction EOB  4 min     PROM Flexion  6 min                              Modalities: 9/3, 9/1, 8/30,8/27, 8/26, 8/24: VASO x  15' mod pressure in long sit position    Pt. Education:  -patient educated on diagnosis, prognosis and expectations for rehab  -all patient questions were answered    Home Exercise Program:  Access Code: [email or web address]  URL: [email or web address]  Date: 08/20/2021  Prepared by: Anselmo Nicolas Rose    Exercises  Supine Quad Set - 1 x daily - 7 x weekly - 3 sets - 10 reps  Supine Heel Slide with Strap - 1 x daily - 7 x weekly - 3 sets - 10 reps - 5 hold  Supine Hip Abduction - 1 x daily - 7 x weekly - 3 sets - 10 reps  Supine Bridge - 1 x daily - 7 x weekly - 3 sets - 10 reps        Therapeutic Exercise and NMR EXR  [x] (80363) Provided verbal/tactile cueing for activities related to strengthening, flexibility, endurance, ROM for improvements in  [x] LE / Lumbar: LE, proximal hip, and core control with self care, mobility, lifting, ambulation. [] UE / Cervical: cervical, postural, scapular, scapulothoracic and UE control with self care, reaching, carrying, lifting, house/yardwork, driving, computer work.  [] (97538) Provided verbal/tactile cueing for activities related to improving balance, coordination, kinesthetic sense, posture, motor skill, proprioception to assist with   [] LE / lumbar: LE, proximal hip, and core control in self care, mobility, lifting, ambulation and eccentric single leg control. [] UE / cervical: cervical, scapular, scapulothoracic and UE control with self care, reaching, carrying, lifting, house/yardwork, driving, computer work.   [] (56400) Therapist is in constant attendance of 2 or more patients providing skilled therapy interventions, but not providing any significant amount of measurable one-on-one time to either patient, for improvements in  [] LE / lumbar: LE, proximal hip, and core control in self care, mobility, lifting, ambulation and eccentric single leg control. [] UE / cervical: cervical, scapular, scapulothoracic and UE control with self care, reaching, carrying, lifting, house/yardwork, driving, computer work.      NMR and Therapeutic Activities:    [x] (20019 or 82337) Provided verbal/tactile cueing for activities related to improving balance, coordination, kinesthetic sense, posture, motor skill, proprioception and motor activation to allow for proper function of   [] LE: / Lumbar core, proximal hip and LE with self care and ADLs  [] UE / Cervical: cervical, postural, scapular, scapulothoracic and UE control with self care, carrying, lifting, driving, computer work.   [] (62840) Gait Re-education- Provided training and instruction to the patient for proper LE, core and proximal hip recruitment and positioning and eccentric body weight control with ambulation re-education including up and down stairs     Home Management Training / Self Care:  [] (87926) Provided self-care/home management training related to activities of daily living and compensatory training, and/or use of adaptive equipment for improvement with: ADLs and compensatory training, meal preparation, safety procedures and instruction in use of adaptive equipment, including bathing, grooming, dressing, personal hygiene, basic household cleaning and chores.      Home Exercise Program:    [x] (90682) Reviewed/Progressed HEP activities related to strengthening, flexibility, endurance, ROM of   [] LE / Lumbar: core, proximal hip and LE for functional self-care, mobility, lifting and ambulation/stair navigation   [] UE / Cervical: cervical, postural, scapular, scapulothoracic and UE control with self care, reaching, carrying, lifting, house/yardwork, driving, computer work  [] (96788)Reviewed/Progressed HEP activities related to improving balance, coordination, kinesthetic sense, posture, motor skill, proprioception of   [] LE: core, proximal hip and LE for self care, mobility, lifting, and ambulation/stair navigation    [] UE / Cervical: cervical, postural,  scapular, scapulothoracic and UE control with self care, reaching, carrying, lifting, house/yardwork, driving, computer work    Manual Treatments:  PROM / STM / Oscillations-Mobs:  G-I, II, III, IV (PA's, Inf., Post.)  [x] (01688) Provided manual therapy to mobilize LE, proximal hip and/or LS spine soft tissue/joints for the purpose of modulating pain, promoting relaxation,  increasing ROM, reducing/eliminating soft tissue swelling/inflammation/restriction, improving soft tissue extensibility and allowing for proper ROM for normal function with   [x] LE / lumbar: self care, mobility, lifting and ambulation. [] UE / Cervical: self care, reaching, carrying, lifting, house/yardwork, driving, computer work. Modalities:  [x] (45083) Vasopneumatic compression: Utilized vasopneumatic compression to decrease edema / swelling for the purpose of improving mobility and quad tone / recruitment which will allow for increased overall function including but not limited to self-care, transfers, ambulation, and ascending / descending stairs. Charges:  Timed Code Treatment Minutes: 40   Total Treatment Minutes: 55     [] EVAL - LOW (24716)   [] EVAL - MOD (83760)  [] EVAL - HIGH (35139)  [] RE-EVAL (84485)  [x] SY(59080) x 1     [] Ionto  [] NMR (08593) x       [x] Vaso  [x] Manual (59963) x  1     [] Ultrasound  [x] TA x 1       [] Mech Traction (16326)  [] Aquatic Therapy x     [] ES (un) (57964):   [] Home Management Training x  [] ES(attended) (37971)   [] Dry Needling 1-2 muscles (45280):  [] Dry Needling 3+ muscles (451333)  [] Group:      [] Other:     GOALS:   Patient stated goal: to improve full range of motion,  []? Progressing: []? Met: []? Not Met: []? Adjusted     Therapist goals for Patient:   Short Term Goals: To be achieved in: 2 weeks  1. Independent in HEP and progression per patient tolerance, in order to prevent re-injury. []? Progressing: []? Met: []? Not Met: []? Adjusted  2. Patient will have a decrease in pain to facilitate improvement in movement, function, and ADLs as indicated by Functional Deficits. []? Progressing: []? Met: []? Not Met: []? Adjusted     Long Term Goals: To be achieved in: 7 weeks/ DC   1. Disability index score of 20% or less for the LEFS to assist with reaching prior level of function. []? Progressing: []? Met: []?  Not Met: []? Adjusted  2. Patient will demonstrate increased AROM to right knee flexion to 120 deg and knee extension 0 deg to allow for proper joint functioning as indicated by patients Functional Deficits. []? Progressing: []? Met: []? Not Met: []? Adjusted  3. Patient will demonstrate an increase in Strength to at least +4/5 as well as good proximal hip strength and control to allow for proper functional mobility as indicated by patients Functional Deficits. []? Progressing: []? Met: []? Not Met: []? Adjusted  4. Patient will return to functional activities including  ambulation without AD without increased symptoms or restriction. []? Progressing: []? Met: []? Not Met: []? Adjusted  5. Patient to be able to sleep through the night without symptoms . []? Progressing: []? Met: []? Not Met: []? Adjusted       Overall Progression Towards Functional goals/ Treatment Progress Update:  [] Patient is progressing as expected towards functional goals listed. [] Progression is slowed due to complexities/Impairments listed. [] Progression has been slowed due to co-morbidities. [x] Plan just implemented, too soon to assess goals progression <30days   [] Goals require adjustment due to lack of progress  [] Patient is not progressing as expected and requires additional follow up with physician  [] Other    Persisting Functional Limitations/Impairments:  []Sleeping []Sitting               []Standing []Transfers        [x]Walking []Kneeling               []Stairs [x]Squatting / bending   []ADLs []Reaching  []Lifting  []Housework  []Driving []Job related tasks  []Sports/Recreation []Other:        ASSESSMENT:  Pt presents with slight improvement in PROM flexion this date to 84 deg, however continues to present with significant deficits in flexion ROM. Is able to ambulate with toe off and knee flexion in swing phase with frequent cueing with RW.  Asked pt to focus on flexion ROM over the weekend with the goal of getting at least 90 PROM npv. Pt ed also provided regarding use of compression to decrease swelling and improve range. Dressing changed this date due to loosening of Tegaderm. Continue to aggressively pursue PROM flexion. Treatment/Activity Tolerance:  [] Patient able to complete tx  [x] Patient limited by fatigue  [x] Patient limited by pain  [] Patient limited by other medical complications  [] Other:     Prognosis: [] Good [] Fair  [] Poor    Patient Requires Follow-up: [x] Yes  [] No    Plan for next treatment session:      PLAN: See eval. PT 3x / week for  7 weeks. [x] Continue per plan of care [] Alter current plan (see comments)  [] Plan of care initiated [] Hold pending MD visit [] Discharge    Electronically signed by: Rafa Blandon, PT DPT    Note: If patient does not return for scheduled/ recommended follow up visits, this note will serve as a discharge from care along with most recent update on progress.

## 2021-09-07 ENCOUNTER — HOSPITAL ENCOUNTER (OUTPATIENT)
Dept: PHYSICAL THERAPY | Age: 61
Setting detail: THERAPIES SERIES
Discharge: HOME | End: 2021-09-07
Payer: COMMERCIAL

## 2021-09-07 PROCEDURE — 97530 THERAPEUTIC ACTIVITIES: CPT

## 2021-09-07 PROCEDURE — 97110 THERAPEUTIC EXERCISES: CPT

## 2021-09-07 PROCEDURE — 97140 MANUAL THERAPY 1/> REGIONS: CPT

## 2021-09-07 NOTE — FLOWSHEET NOTE
[address] [facility]  Phone: [phone]   Fax: [phone]    Physical Therapy Daily Treatment Note  Date:  2021    Patient Name:  Bradley Denton    :  1960  MRN: [id number]  Medical/Treatment Diagnosis Information:  Diagnosis: M17.11 (ICD-10-CM) - Unilateral primary osteoarthritis, right knee, Z96.651 (ICD-10-CM) - Presence of right artificial knee joint  DOS: 21  Treatment Diagnosis: Decrease knee AROM , difficulty walking, imbalance  Insurance/Certification information:  PT Insurance Information: [facility]  Physician Information:  Referring Practitioner: Abraham Weber MD  Plan of care signed (Y/N): []  Yes [x]  No     Date of Patient follow up with Physician: 21     Progress Report: []  Yes  [x]  No     Date Range for reporting period:  Beginnin21  Ending:     Progress report due (10 Rx/or 30 days whichever is less): visit #38      Recertification due (POC duration/ or 90 days whichever is less): visit # 21    Visit # Insurance Allowable Auth required? Date Range    Mn []  Yes  [x]  No NA       Latex Allergy:  [x]NO      []YES  Preferred Language for Healthcare:   [x]English       []other:    Functional Scale:        Date assessed:  LEFS: raw score =15 /80; dysfunction = 81%  21    Pain level:  4-5/10     SUBJECTIVE:   Patient reports he really worked on his bend over the weekend. It is feeling pretty good this morning. He has been icing after HEP. OBJECTIVE:   : PROM 104 deg  9/3/21: PROM: 84 deg    21: ERMI R Knee Flexion: 80 degrees; R Knee Extension w/ quad set +3 degrees    : AROM knee ext 0 with quad set, knee flexion 76 degrees with heel slide and overpressure.  Replaced top section of Tegaderm bandage due to rolling and falling off   -During session today in supine, pt was able to tolerate ROM and transition to strength activity however became significantly diaphoretic and short of breath transitioning to sitting given cold water to drink and towel. All symptoms resolved within 5 mins. 8/24/21:EOB A/A R Knee Flexion: 76 degrees       Not  prehab just using values as baseline   Functional testing Pre hab        Date   eval post op   Date 8/20/21 4 week f/u   9/14 8 week f/u  10/12 D/c            TUG  TBD      30 second sit to stand  TBD      6 minute walk  244      Balance        Narrow NIMCO  30s      Semi tandem        Tandem   R 30s, L 24s      SLS        Knee AROM  Flex 72, ext +2      Knee Extension MMT R/L  -3      Hip aBduction MMT R/L  -3      LEFS                      RESTRICTIONS/PRECAUTIONS:  B PRINCESS     Exercises/Interventions:     Therapeutic Exercises (96907) Resistance / level Sets/sec Reps Notes   Bike/ Nustep  4 min  Half reciprocol   EOB A/A Flexion  8/24-added    Long Sit Calf Stretch with Strap   8/24-added    IB  HSS supine or seated       Standing HR   8/24-added    Quad sets  5\"H  15x     Heel slides with strap  10\"/1 sets x 10    ERMI  8/30 added    Bridging     8/30 added    Standing [address]   8/24-added   LAQ  8/27 updated    SLR ecc 5 AAROM ; conc 5    8/30 added    Knee flexion lunge stretch on step  30\" 4 9/3   SAQ  1 10 9/7                 Therapeutic Activities (29989)       Dressing change    9/3   Gait ed with RW, for upright posture, toe off, and flexion in swing phase  5 min  9/3   Step over raulito  10  9/7 cues to roll off toe                  Neuromuscular Re-ed (36731)       SAQ  8/24-added                                       Manual Intervention (30851)       Patella mobs  2 min     Tibia/fib mobs/ distraction EOB  4 min     PROM Flexion  6 min                              Modalities: 9/7 9/3, 9/1, 8/30,8/27, 8/26, 8/24: VASO x  15' mod pressure in long sit position    Pt. Education:  -patient educated on diagnosis, prognosis and expectations for rehab  -all patient questions were answered    Home Exercise Program:  Access Code: [email or web address]  URL: [email or web address]  Date: 08/20/2021  Prepared by: Anh Ag    Exercises  Supine Quad Set - 1 x daily - 7 x weekly - 3 sets - 10 reps  Supine Heel Slide with Strap - 1 x daily - 7 x weekly - 3 sets - 10 reps - 5 hold  Supine Hip Abduction - 1 x daily - 7 x weekly - 3 sets - 10 reps  Supine Bridge - 1 x daily - 7 x weekly - 3 sets - 10 reps        Therapeutic Exercise and NMR EXR  [x] (44343) Provided verbal/tactile cueing for activities related to strengthening, flexibility, endurance, ROM for improvements in  [x] LE / Lumbar: LE, proximal hip, and core control with self care, mobility, lifting, ambulation. [] UE / Cervical: cervical, postural, scapular, scapulothoracic and UE control with self care, reaching, carrying, lifting, house/yardwork, driving, computer work.  [] (18857) Provided verbal/tactile cueing for activities related to improving balance, coordination, kinesthetic sense, posture, motor skill, proprioception to assist with   [] LE / lumbar: LE, proximal hip, and core control in self care, mobility, lifting, ambulation and eccentric single leg control. [] UE / cervical: cervical, scapular, scapulothoracic and UE control with self care, reaching, carrying, lifting, house/yardwork, driving, computer work.   [] (28429) Therapist is in constant attendance of 2 or more patients providing skilled therapy interventions, but not providing any significant amount of measurable one-on-one time to either patient, for improvements in  [] LE / lumbar: LE, proximal hip, and core control in self care, mobility, lifting, ambulation and eccentric single leg control. [] UE / cervical: cervical, scapular, scapulothoracic and UE control with self care, reaching, carrying, lifting, house/yardwork, driving, computer work.      NMR and Therapeutic Activities:    [x] (87743 or 39935) Provided verbal/tactile cueing for activities related to improving balance, coordination, kinesthetic sense, posture, motor skill, proprioception and motor activation to allow for proper function of   [] LE: / Lumbar core, proximal hip and LE with self care and ADLs  [] UE / Cervical: cervical, postural, scapular, scapulothoracic and UE control with self care, carrying, lifting, driving, computer work.   [] (64285) Gait Re-education- Provided training and instruction to the patient for proper LE, core and proximal hip recruitment and positioning and eccentric body weight control with ambulation re-education including up and down stairs     Home Management Training / Self Care:  [] (34950) Provided self-care/home management training related to activities of daily living and compensatory training, and/or use of adaptive equipment for improvement with: ADLs and compensatory training, meal preparation, safety procedures and instruction in use of adaptive equipment, including bathing, grooming, dressing, personal hygiene, basic household cleaning and chores.      Home Exercise Program:    [x] (30261) Reviewed/Progressed HEP activities related to strengthening, flexibility, endurance, ROM of   [] LE / Lumbar: core, proximal hip and LE for functional self-care, mobility, lifting and ambulation/stair navigation   [] UE / Cervical: cervical, postural, scapular, scapulothoracic and UE control with self care, reaching, carrying, lifting, house/yardwork, driving, computer work  [] (05170)Reviewed/Progressed HEP activities related to improving balance, coordination, kinesthetic sense, posture, motor skill, proprioception of   [] LE: core, proximal hip and LE for self care, mobility, lifting, and ambulation/stair navigation    [] UE / Cervical: cervical, postural,  scapular, scapulothoracic and UE control with self care, reaching, carrying, lifting, house/yardwork, driving, computer work    Manual Treatments:  PROM / STM / Oscillations-Mobs:  G-I, II, III, IV (PA's, Inf., Post.)  [x] (45605) Provided manual therapy to mobilize LE, proximal hip and/or LS spine soft tissue/joints for the purpose of modulating pain, promoting relaxation,  increasing ROM, reducing/eliminating soft tissue swelling/inflammation/restriction, improving soft tissue extensibility and allowing for proper ROM for normal function with   [x] LE / lumbar: self care, mobility, lifting and ambulation. [] UE / Cervical: self care, reaching, carrying, lifting, house/yardwork, driving, computer work. Modalities:  [x] (01098) Vasopneumatic compression: Utilized vasopneumatic compression to decrease edema / swelling for the purpose of improving mobility and quad tone / recruitment which will allow for increased overall function including but not limited to self-care, transfers, ambulation, and ascending / descending stairs. Charges:  Timed Code Treatment Minutes: 38   Total Treatment Minutes: 53     [] EVAL - LOW (95143)   [] EVAL - MOD (42006)  [] EVAL - HIGH (10049)  [] RE-EVAL (42244)  [x] ZX(70138) x 1     [] Ionto  [] NMR (50074) x       [x] Vaso  [x] Manual (21514) x  1     [] Ultrasound  [x] TA x 1       [] Mech Traction (19536)  [] Aquatic Therapy x     [] ES (un) (03192):   [] Home Management Training x  [] ES(attended) (75162)   [] Dry Needling 1-2 muscles (94069):  [] Dry Needling 3+ muscles (195302)  [] Group:      [] Other:     GOALS:   Patient stated goal: to improve full range of motion,  []? Progressing: []? Met: []? Not Met: []? Adjusted     Therapist goals for Patient:   Short Term Goals: To be achieved in: 2 weeks  1. Independent in HEP and progression per patient tolerance, in order to prevent re-injury. []? Progressing: []? Met: []? Not Met: []? Adjusted  2. Patient will have a decrease in pain to facilitate improvement in movement, function, and ADLs as indicated by Functional Deficits. []? Progressing: []? Met: []? Not Met: []? Adjusted     Long Term Goals: To be achieved in: 7 weeks/ DC   1. Disability index score of 20% or less for the LEFS to assist with reaching prior level of function. []? Progressing: []? Met: []? Not Met: []? Adjusted  2. Patient will demonstrate increased AROM to right knee flexion to 120 deg and knee extension 0 deg to allow for proper joint functioning as indicated by patients Functional Deficits. []? Progressing: []? Met: []? Not Met: []? Adjusted  3. Patient will demonstrate an increase in Strength to at least +4/5 as well as good proximal hip strength and control to allow for proper functional mobility as indicated by patients Functional Deficits. []? Progressing: []? Met: []? Not Met: []? Adjusted  4. Patient will return to functional activities including  ambulation without AD without increased symptoms or restriction. []? Progressing: []? Met: []? Not Met: []? Adjusted  5. Patient to be able to sleep through the night without symptoms . []? Progressing: []? Met: []? Not Met: []? Adjusted       Overall Progression Towards Functional goals/ Treatment Progress Update:  [] Patient is progressing as expected towards functional goals listed. [] Progression is slowed due to complexities/Impairments listed. [] Progression has been slowed due to co-morbidities. [x] Plan just implemented, too soon to assess goals progression <30days   [] Goals require adjustment due to lack of progress  [] Patient is not progressing as expected and requires additional follow up with physician  [] Other    Persisting Functional Limitations/Impairments:  []Sleeping []Sitting               []Standing []Transfers        [x]Walking []Kneeling               []Stairs [x]Squatting / bending   []ADLs []Reaching  []Lifting  []Housework  []Driving []Job related tasks  []Sports/Recreation []Other:        ASSESSMENT:  Pt presents with significant improvement in flexion PROM this date to 104 deg. Also presents with decreased swelling. Continues to require frequent cueing to maintain toe off at terminal stance and increased knee flexion in swing phase.   Attempted supine SLR, however unable to perform without significant lag. Resumed SAQ this date. Continue to aggressively pursue PROM flexion. Treatment/Activity Tolerance:  [] Patient able to complete tx  [x] Patient limited by fatigue  [x] Patient limited by pain  [] Patient limited by other medical complications  [] Other:     Prognosis: [] Good [] Fair  [] Poor    Patient Requires Follow-up: [x] Yes  [] No    Plan for next treatment session:      PLAN: See eval. PT 3x / week for  7 weeks. [x] Continue per plan of care [] Alter current plan (see comments)  [] Plan of care initiated [] Hold pending MD visit [] Discharge    Electronically signed by: Basim Clement PT DPT    Note: If patient does not return for scheduled/ recommended follow up visits, this note will serve as a discharge from care along with most recent update on progress.

## 2021-09-08 ENCOUNTER — HOSPITAL ENCOUNTER (OUTPATIENT)
Dept: PHYSICAL THERAPY | Age: 61
Setting detail: THERAPIES SERIES
Discharge: HOME | End: 2021-09-08
Payer: COMMERCIAL

## 2021-09-08 PROCEDURE — 97110 THERAPEUTIC EXERCISES: CPT

## 2021-09-08 PROCEDURE — 97016 VASOPNEUMATIC DEVICE THERAPY: CPT

## 2021-09-08 PROCEDURE — 97140 MANUAL THERAPY 1/> REGIONS: CPT

## 2021-09-08 PROCEDURE — 97530 THERAPEUTIC ACTIVITIES: CPT

## 2021-09-08 NOTE — FLOWSHEET NOTE
[address] [facility]  Phone: [phone]   Fax: [phone]    Physical Therapy Daily Treatment Note  Date:  2021    Patient Name:  Darrell Barrios    :  1960  MRN: [id number]  Medical/Treatment Diagnosis Information:  Diagnosis: M17.11 (ICD-10-CM) - Unilateral primary osteoarthritis, right knee, Z96.651 (ICD-10-CM) - Presence of right artificial knee joint  DOS: 21  Treatment Diagnosis: Decrease knee AROM , difficulty walking, imbalance  Insurance/Certification information:  PT Insurance Information: [facility]  Physician Information:  Referring Practitioner: Harinder Elam MD  Plan of care signed (Y/N): []  Yes [x]  No     Date of Patient follow up with Physician: 21     Progress Report: []  Yes  [x]  No     Date Range for reporting period:  Beginnin21  Ending:     Progress report due (10 Rx/or 30 days whichever is less): visit #84      Recertification due (POC duration/ or 90 days whichever is less): visit # 21    Visit # Insurance Allowable Auth required? Date Range    Mn []  Yes  [x]  No NA       Latex Allergy:  [x]NO      []YES  Preferred Language for Healthcare:   [x]English       []other:    Functional Scale:        Date assessed:  LEFS: raw score =15 /80; dysfunction = 81%  21    Pain level:  4-5/10     SUBJECTIVE:  : Pt reports that he's feeling good this morning. He says that icing after doing his HEP is making a huge difference. Patient reports he really worked on his bend over the weekend. It is feeling pretty good this morning. He states that he has been icing after HEP. OBJECTIVE:  : Pt received verbal/tactile cueing for heel strike during gait ed. Achieved 106 deg of AAROM R knee flexion w/ strap during heel slide. Took off bandage and replaced with Tegaderm gauze.     : PROM 104 deg  9/3/21: PROM: 84 deg    21: ERMI R Knee Flexion: 80 degrees; R Knee Extension w/ quad set +3 degrees    : AROM knee ext 0 with quad set, knee flexion 76 degrees with heel slide and overpressure. Replaced top section of Tegaderm bandage due to rolling and falling off   -During session today in supine, pt was able to tolerate ROM and transition to strength activity however became significantly diaphoretic and short of breath transitioning to sitting given cold water to drink and towel. All symptoms resolved within 5 mins.    8/24/21:EOB A/A R Knee Flexion: 76 degrees       Not  prehab just using values as baseline   Functional testing Pre hab        Date   eval post op   Date 8/20/21 4 week f/u   9/14 8 week f/u  10/12 D/c            TUG  TBD      30 second sit to stand  TBD      6 minute walk  244      Balance        Narrow NIMCO  30s      Semi tandem        Tandem   R 30s, L 24s      SLS        Knee AROM  Flex 72, ext +2      Knee Extension MMT R/L  -3      Hip aBduction MMT R/L  -3      LEFS                      RESTRICTIONS/PRECAUTIONS:  B PRINCESS     Exercises/Interventions:     Therapeutic Exercises (50861) Resistance / level Sets/sec Reps Notes   Bike/ Nustep  4 min  9/8 Backwards reciprocal   Half reciprocol   EOB A/A Flexion  8/24-added    Long Sit Calf Stretch with Strap   8/24-added    IB  HSS supine or seated  30\" 2x    Standing HR   8/24-added    Quad sets  5\"H  15x     Heel slides with strap  10\"/1 sets x 10    ERMI  8/30 added    Bridging     8/30 added    Standing [address]   8/24-added   LAQ  8/27 updated    SLR ecc 5 AAROM ; conc 5    8/30 added    Knee flexion lunge stretch on step  30\" 4 9/3   SAQ  1 10 9/7                 Therapeutic Activities (10914)       Dressing change  15 min  9/8   Gait ed with RW, for upright posture, toe off, and flexion in swing phase    9/3   Step over raulito  10  9/7 cues to roll off toe   9/8 cues to heel strike                 Neuromuscular Re-ed (88016)       SAQ  8/24-added                                       Manual Intervention (62105)       Patella mobs       Tibia/fib mobs/ distraction EOB  4 min     PROM Flexion                                Modalities: 9/8 ,9/7, 9/3, 9/1, 8/30,8/27, 8/26, 8/24: VASO x 10'   mod pressure in long sit position    Pt. Education:  -patient educated on diagnosis, prognosis and expectations for rehab  -all patient questions were answered    Home Exercise Program:  Access Code: [email or web address]  URL: [email or web address]  Date: 08/20/2021  Prepared by: Komal Saunders    Exercises  Supine Quad Set - 1 x daily - 7 x weekly - 3 sets - 10 reps  Supine Heel Slide with Strap - 1 x daily - 7 x weekly - 3 sets - 10 reps - 5 hold  Supine Hip Abduction - 1 x daily - 7 x weekly - 3 sets - 10 reps  Supine Bridge - 1 x daily - 7 x weekly - 3 sets - 10 reps        Therapeutic Exercise and NMR EXR  [x] (23110) Provided verbal/tactile cueing for activities related to strengthening, flexibility, endurance, ROM for improvements in  [x] LE / Lumbar: LE, proximal hip, and core control with self care, mobility, lifting, ambulation. [] UE / Cervical: cervical, postural, scapular, scapulothoracic and UE control with self care, reaching, carrying, lifting, house/yardwork, driving, computer work.  [] (02991) Provided verbal/tactile cueing for activities related to improving balance, coordination, kinesthetic sense, posture, motor skill, proprioception to assist with   [] LE / lumbar: LE, proximal hip, and core control in self care, mobility, lifting, ambulation and eccentric single leg control.    [] UE / cervical: cervical, scapular, scapulothoracic and UE control with self care, reaching, carrying, lifting, house/yardwork, driving, computer work.   [] (87634) Therapist is in constant attendance of 2 or more patients providing skilled therapy interventions, but not providing any significant amount of measurable one-on-one time to either patient, for improvements in  [] LE / lumbar: LE, proximal hip, and core control in self care, mobility, lifting, ambulation and eccentric single Progressing: []? Met: []? Not Met: []? Adjusted  2. Patient will have a decrease in pain to facilitate improvement in movement, function, and ADLs as indicated by Functional Deficits. []? Progressing: []? Met: []? Not Met: []? Adjusted     Long Term Goals: To be achieved in: 7 weeks/ DC   1. Disability index score of 20% or less for the LEFS to assist with reaching prior level of function. []? Progressing: []? Met: []? Not Met: []? Adjusted  2. Patient will demonstrate increased AROM to right knee flexion to 120 deg and knee extension 0 deg to allow for proper joint functioning as indicated by patients Functional Deficits. []? Progressing: []? Met: []? Not Met: []? Adjusted  3. Patient will demonstrate an increase in Strength to at least +4/5 as well as good proximal hip strength and control to allow for proper functional mobility as indicated by patients Functional Deficits. []? Progressing: []? Met: []? Not Met: []? Adjusted  4. Patient will return to functional activities including  ambulation without AD without increased symptoms or restriction. []? Progressing: []? Met: []? Not Met: []? Adjusted  5. Patient to be able to sleep through the night without symptoms . []? Progressing: []? Met: []? Not Met: []? Adjusted       Overall Progression Towards Functional goals/ Treatment Progress Update:  [] Patient is progressing as expected towards functional goals listed. [] Progression is slowed due to complexities/Impairments listed. [] Progression has been slowed due to co-morbidities.   [x] Plan just implemented, too soon to assess goals progression <30days   [] Goals require adjustment due to lack of progress  [] Patient is not progressing as expected and requires additional follow up with physician  [] Other    Persisting Functional Limitations/Impairments:  []Sleeping []Sitting               []Standing [x]Transfers        [x]Walking []Kneeling               [x]Stairs [x]Squatting / bending   []ADLs []Reaching  []Lifting  []Housework  [x]Driving []Job related tasks  []Sports/Recreation []Other:        ASSESSMENT:  9/8: Pt presents with an improvement in R knee flexion AAROM of 106 degrees. Pt was able to achieve full reciprocal motion backwards on bike for the first time. He continues to require cueing for toe off at terminal stance and heel strike during step phase. Will continue to work on R knee flexion and proper gait mechanics. Will  monitor Tegaderm dressing and will replace as needed. Pt presents with significant improvement in flexion PROM this date to 104 deg. Also presents with decreased swelling. Continues to require frequent cueing to maintain toe off at terminal stance and increased knee flexion in swing phase. Attempted supine SLR, however unable to perform without significant lag. Resumed SAQ this date. Continue to aggressively pursue PROM flexion. Treatment/Activity Tolerance:  [] Patient able to complete tx  [x] Patient limited by fatigue  [x] Patient limited by pain  [] Patient limited by other medical complications  [] Other:     Prognosis: [] Good [] Fair  [] Poor    Patient Requires Follow-up: [x] Yes  [] No    Plan for next treatment session:  Manual therapy, Knee flexion, gait mechanics    PLAN: See marquez. PT 3x / week for  7 weeks. [x] Continue per plan of care [] Alter current plan (see comments)  [] Plan of care initiated [] Hold pending MD visit [] Discharge    Electronically signed by: Linette Ricketts, PT DPT    Therapist was present, directed the patient's care, made skilled judgement, and was responsible for assessment and treatment of the patient. Laurel Guzman, SPT    Note: If patient does not return for scheduled/ recommended follow up visits, this note will serve as a discharge from care along with most recent update on progress.

## 2021-09-10 ENCOUNTER — HOSPITAL ENCOUNTER (OUTPATIENT)
Dept: PHYSICAL THERAPY | Age: 61
Setting detail: THERAPIES SERIES
Discharge: HOME | End: 2021-09-10
Payer: COMMERCIAL

## 2021-09-10 PROCEDURE — 97140 MANUAL THERAPY 1/> REGIONS: CPT

## 2021-09-10 PROCEDURE — 97016 VASOPNEUMATIC DEVICE THERAPY: CPT

## 2021-09-10 PROCEDURE — 97110 THERAPEUTIC EXERCISES: CPT

## 2021-09-10 NOTE — FLOWSHEET NOTE
[facility]  Phone: [phone]   Fax: [phone]    Physical Therapy Daily Treatment Note  Date:  9/10/2021    Patient Name:  Yohana Kaplan    :  1960  MRN: [id number]  Medical/Treatment Diagnosis Information:  Diagnosis: M17.11 (ICD-10-CM) - Unilateral primary osteoarthritis, right knee, Z96.651 (ICD-10-CM) - Presence of right artificial knee joint  DOS: 21  Treatment Diagnosis: Decrease knee AROM , difficulty walking, imbalance  Insurance/Certification information:  PT Insurance Information: [facility]  Physician Information:  Referring Practitioner: China Jon MD  Plan of care signed (Y/N): []  Yes [x]  No     Date of Patient follow up with Physician: 21     Progress Report: []  Yes  [x]  No     Date Range for reporting period:  Beginnin21  Ending:     Progress report due (10 Rx/or 30 days whichever is less): visit #30      Recertification due (POC duration/ or 90 days whichever is less): visit # 21    Visit # Insurance Allowable Auth required? Date Range   10/20 [place] []  Yes  [x]  No NA       Latex Allergy:  [x]NO      []YES  Preferred Language for Healthcare:   [x]English       []other:    Functional Scale:        Date assessed:  LEFS: raw score =15 /80; dysfunction = 81%  21    Pain level:  5-6/10     SUBJECTIVE:  Pt reports he removed the dressing from the last visit, which was cleared by his surgeon's office. OBJECTIVE:    9/10: AAROM knee flexion (supine with strap): 117 deg    : Pt received verbal/tactile cueing for heel strike during gait ed. Achieved 106 deg of AAROM R knee flexion w/ strap during heel slide. Took off bandage and replaced with Tegaderm gauze. : PROM 104 deg  9/3/21: PROM: 84 deg    21: ERMI R Knee Flexion: 80 degrees; R Knee Extension w/ quad set +3 degrees    : AROM knee ext 0 with quad set, knee flexion 76 degrees with heel slide and overpressure.  Replaced top section of Tegaderm bandage due to rolling and falling off   -During session today in supine, pt was able to tolerate ROM and transition to strength activity however became significantly diaphoretic and short of breath transitioning to sitting given cold water to drink and towel. All symptoms resolved within 5 mins.    8/24/21:EOB A/A R Knee Flexion: 76 degrees       Not  prehab just using values as baseline   Functional testing Pre hab        Date   eval post op   Date 8/20/21 4 week f/u   9/14 8 week f/u  10/12 D/c            TUG  TBD      30 second sit to stand  TBD      6 minute walk  244      Balance        Narrow NIMCO  30s      Semi tandem        Tandem   R 30s, L 24s      SLS        Knee AROM  Flex 72, ext +2      Knee Extension MMT R/L  -3      Hip aBduction MMT R/L  -3      LEFS              RESTRICTIONS/PRECAUTIONS:  B PRINCESS     Exercises/Interventions:     Therapeutic Exercises (46919) Resistance / level Sets/sec Reps Notes   Bike/ Nustep  4 min  9/8 Backwards reciprocal   Half reciprocol  Seat 15 (in [place])   EOB A/A Flexion  8/24-added    Long Sit Calf Stretch with Strap   8/24-added    IB  HSS supine or seated     Standing HR   8/24-added    Quad sets  5\"H  15x     Heel slides with strap  10\"/1 sets x 10    ERMI  30\" 4 8/30 added    Bridging     8/30 added    Standing [address]   8/24-added   LAQ 1# 2 10 8/27 updated    SLR ecc 5 AAROM ; conc 5    8/30 added    Knee flexion lunge stretch on step  30\" 4 9/3   SAQ  2 10 9/7   Resisted hamstring curl Blue TB 1 10 9/10   SLR  2 5 9/10   Therapeutic Activities (93728)       Dressing change    9/8   Gait ed with RW, for upright posture, toe off, and flexion in swing phase    9/3   Step over raulito    9/7 cues to roll off toe   9/8 cues to heel strike                 Neuromuscular Re-ed (20364)       SAQ  8/24-added                                       Manual Intervention (81051)       Patella mobs  2 min     Tibia/fib mobs/ distraction EOB  4 min     PROM Flexion  6 min Modalities: 9/10, 9/8 ,9/7, 9/3, 9/1, 8/30,8/27, 8/26, 8/24: VASO x 10'   mod pressure in long sit position    Pt. Education:  -patient educated on diagnosis, prognosis and expectations for rehab  -all patient questions were answered    Home Exercise Program:  Access Code: [email or web address]  URL: [email or web address]  Date: 08/20/2021  Prepared by: Vicky Cifuentes    Exercises  Supine Quad Set - 1 x daily - 7 x weekly - 3 sets - 10 reps  Supine Heel Slide with Strap - 1 x daily - 7 x weekly - 3 sets - 10 reps - 5 hold  Supine Hip Abduction - 1 x daily - 7 x weekly - 3 sets - 10 reps  Supine Bridge - 1 x daily - 7 x weekly - 3 sets - 10 reps        Therapeutic Exercise and NMR EXR  [x] (28051) Provided verbal/tactile cueing for activities related to strengthening, flexibility, endurance, ROM for improvements in  [x] LE / Lumbar: LE, proximal hip, and core control with self care, mobility, lifting, ambulation. [] UE / Cervical: cervical, postural, scapular, scapulothoracic and UE control with self care, reaching, carrying, lifting, house/yardwork, driving, computer work.  [] (64714) Provided verbal/tactile cueing for activities related to improving balance, coordination, kinesthetic sense, posture, motor skill, proprioception to assist with   [] LE / lumbar: LE, proximal hip, and core control in self care, mobility, lifting, ambulation and eccentric single leg control. [] UE / cervical: cervical, scapular, scapulothoracic and UE control with self care, reaching, carrying, lifting, house/yardwork, driving, computer work.   [] (46762) Therapist is in constant attendance of 2 or more patients providing skilled therapy interventions, but not providing any significant amount of measurable one-on-one time to either patient, for improvements in  [] LE / lumbar: LE, proximal hip, and core control in self care, mobility, lifting, ambulation and eccentric single leg control.    [] UE / cervical: cervical, scapular, scapulothoracic and UE control with self care, reaching, carrying, lifting, house/yardwork, driving, computer work. NMR and Therapeutic Activities:    [x] (59324 or 74672) Provided verbal/tactile cueing for activities related to improving balance, coordination, kinesthetic sense, posture, motor skill, proprioception and motor activation to allow for proper function of   [] LE: / Lumbar core, proximal hip and LE with self care and ADLs  [] UE / Cervical: cervical, postural, scapular, scapulothoracic and UE control with self care, carrying, lifting, driving, computer work.   [] (37088) Gait Re-education- Provided training and instruction to the patient for proper LE, core and proximal hip recruitment and positioning and eccentric body weight control with ambulation re-education including up and down stairs     Home Management Training / Self Care:  [] (63493) Provided self-care/home management training related to activities of daily living and compensatory training, and/or use of adaptive equipment for improvement with: ADLs and compensatory training, meal preparation, safety procedures and instruction in use of adaptive equipment, including bathing, grooming, dressing, personal hygiene, basic household cleaning and chores.      Home Exercise Program:    [x] (07292) Reviewed/Progressed HEP activities related to strengthening, flexibility, endurance, ROM of   [] LE / Lumbar: core, proximal hip and LE for functional self-care, mobility, lifting and ambulation/stair navigation   [] UE / Cervical: cervical, postural, scapular, scapulothoracic and UE control with self care, reaching, carrying, lifting, house/yardwork, driving, computer work  [] (59719)Reviewed/Progressed HEP activities related to improving balance, coordination, kinesthetic sense, posture, motor skill, proprioception of   [] LE: core, proximal hip and LE for self care, mobility, lifting, and ambulation/stair navigation    [] UE / Cervical: cervical, postural,  scapular, scapulothoracic and UE control with self care, reaching, carrying, lifting, house/yardwork, driving, computer work    Manual Treatments:  PROM / STM / Oscillations-Mobs:  G-I, II, III, IV (PA's, Inf., Post.)  [x] (98954) Provided manual therapy to mobilize LE, proximal hip and/or LS spine soft tissue/joints for the purpose of modulating pain, promoting relaxation,  increasing ROM, reducing/eliminating soft tissue swelling/inflammation/restriction, improving soft tissue extensibility and allowing for proper ROM for normal function with   [x] LE / lumbar: self care, mobility, lifting and ambulation. [] UE / Cervical: self care, reaching, carrying, lifting, house/yardwork, driving, computer work. Modalities:  [x] (24183) Vasopneumatic compression: Utilized vasopneumatic compression to decrease edema / swelling for the purpose of improving mobility and quad tone / recruitment which will allow for increased overall function including but not limited to self-care, transfers, ambulation, and ascending / descending stairs. Charges:  Timed Code Treatment Minutes: 44   Total Treatment Minutes: 59     [] EVAL - LOW (29842)   [] EVAL - MOD (19214)  [] EVAL - HIGH (68816)  [] RE-EVAL (76736)  [x] GT(88741) x 2     [] Ionto  [] NMR (97920) x       [x] Vaso  [x] Manual (18994) x  1     [] Ultrasound  [] TA x 1       [] Mech Traction (75903)  [] Aquatic Therapy x     [] ES (un) (96651):   [] Home Management Training x  [] ES(attended) (22810)   [] Dry Needling 1-2 muscles (28194):  [] Dry Needling 3+ muscles (708343)  [] Group:      [] Other:     GOALS:   Patient stated goal: to improve full range of motion,  []? Progressing: []? Met: []? Not Met: []? Adjusted     Therapist goals for Patient:   Short Term Goals: To be achieved in: 2 weeks  1. Independent in HEP and progression per patient tolerance, in order to prevent re-injury. []? Progressing: []? Met: []? Not Met: []? Adjusted  2. Patient will have a decrease in pain to facilitate improvement in movement, function, and ADLs as indicated by Functional Deficits. []? Progressing: []? Met: []? Not Met: []? Adjusted     Long Term Goals: To be achieved in: 7 weeks/ DC   1. Disability index score of 20% or less for the LEFS to assist with reaching prior level of function. []? Progressing: []? Met: []? Not Met: []? Adjusted  2. Patient will demonstrate increased AROM to right knee flexion to 120 deg and knee extension 0 deg to allow for proper joint functioning as indicated by patients Functional Deficits. []? Progressing: []? Met: []? Not Met: []? Adjusted  3. Patient will demonstrate an increase in Strength to at least +4/5 as well as good proximal hip strength and control to allow for proper functional mobility as indicated by patients Functional Deficits. []? Progressing: []? Met: []? Not Met: []? Adjusted  4. Patient will return to functional activities including  ambulation without AD without increased symptoms or restriction. []? Progressing: []? Met: []? Not Met: []? Adjusted  5. Patient to be able to sleep through the night without symptoms . []? Progressing: []? Met: []? Not Met: []? Adjusted       Overall Progression Towards Functional goals/ Treatment Progress Update:  [] Patient is progressing as expected towards functional goals listed. [] Progression is slowed due to complexities/Impairments listed. [] Progression has been slowed due to co-morbidities.   [x] Plan just implemented, too soon to assess goals progression <30days   [] Goals require adjustment due to lack of progress  [] Patient is not progressing as expected and requires additional follow up with physician  [] Other    Persisting Functional Limitations/Impairments:  []Sleeping []Sitting               []Standing [x]Transfers        [x]Walking []Kneeling               [x]Stairs [x]Squatting / bending   []ADLs []Reaching  []Lifting []Housework  [x]Driving []Job related tasks  []Sports/Recreation []Other:        ASSESSMENT: Pt presents with improved knee flexion PROM to 117 deg this date. Reintroduced supine SLR. Pt presents with mild extensor lag that he is able to improve with verbal and tactile cueing. Continue to progress flexion ROM and gross LE strength as able to tolerate. Pt will need updated prehab measures and PN next week. Treatment/Activity Tolerance:  [] Patient able to complete tx  [x] Patient limited by fatigue  [x] Patient limited by pain  [] Patient limited by other medical complications  [] Other:     Prognosis: [] Good [] Fair  [] Poor    Patient Requires Follow-up: [x] Yes  [] No    Plan for next treatment session:  Manual therapy, Knee flexion, gait mechanics    PLAN: See [facility] PT 3x / week for  7 weeks. [x] Continue per plan of care [] Alter current plan (see comments)  [] Plan of care initiated [] Hold pending MD visit [] Discharge    Electronically signed by: Ciaran Dela Cruz, PT, DPT      Note: If patient does not return for scheduled/ recommended follow up visits, this note will serve as a discharge from care along with most recent update on progress.

## 2021-09-13 ENCOUNTER — HOSPITAL ENCOUNTER (OUTPATIENT)
Dept: PHYSICAL THERAPY | Age: 61
Setting detail: THERAPIES SERIES
Discharge: HOME | End: 2021-09-13
Payer: COMMERCIAL

## 2021-09-13 PROCEDURE — 97530 THERAPEUTIC ACTIVITIES: CPT

## 2021-09-13 PROCEDURE — 97016 VASOPNEUMATIC DEVICE THERAPY: CPT

## 2021-09-13 PROCEDURE — 97750 PHYSICAL PERFORMANCE TEST: CPT

## 2021-09-13 NOTE — FLOWSHEET NOTE
[address] [facility]  Phone: [phone]   Fax: [phone]      Physical Therapy Daily Treatment /Progress Note  Date:  2021    Patient Name:  Belinda Gonzalez    :  1960  MRN: [id number]  Medical/Treatment Diagnosis Information:  Diagnosis: M17.11 (ICD-10-CM) - Unilateral primary osteoarthritis, right knee, Z96.651 (ICD-10-CM) - Presence of right artificial knee joint  DOS: 21  Treatment Diagnosis: Decrease knee AROM , difficulty walking, imbalance  Insurance/Certification information:  PT Insurance Information: [facility]  Physician Information:  Referring Practitioner: Bria Elkins MD  Plan of care signed (Y/N): []  Yes [x]  No     Date of Patient follow up with Physician: 21     Progress Report: [x]  Yes  []  No     Date Range for reporting period:  Beginnin21  Ending:     Progress report due (10 Rx/or 30 days whichever is less): visit #60      Recertification due (POC duration/ or 90 days whichever is less): visit # 21    Visit # Insurance Allowable Auth required? Date Range    Mn []  Yes  [x]  No NA       Latex Allergy:  [x]NO      []YES  Preferred Language for Healthcare:   [x]English       []other:    Functional Scale:       Date assessed:  LEFS: raw score = 33/80; dysfunction = 59%  21  LEFS: raw score =15 /80; dysfunction = 81%  21    Pain level:  310     SUBJECTIVE:  Pt states he feels good today with his R knee being just a little stiff. OBJECTIVE:  : AAROM R knee flexion (supine with strap): 116 deg, R knee extension: -1 deg    9/10: AAROM knee flexion (supine with strap): 117 deg    : Pt received verbal/tactile cueing for heel strike during gait ed. Achieved 106 deg of AAROM R knee flexion w/ strap during heel slide. Took off bandage and replaced with Tegaderm gauze.     : PROM 104 deg      Not  prehab just using values as baseline   Functional testing Pre hab        Date   eval post op   Date 8/20/21 4 week f/u   9/14 8 week f/u  10/12 D/c            TUG  TBD      30 second sit to stand  TBD      6 minute walk  244 287     Balance        Narrow NIMCO  30s 30s     Semi tandem        Tandem   R 30s, L 24s R 30s, L 26s     SLS        Knee AROM  Flex 72, ext +2 Flex 116, ext -1      Knee Extension MMT R/L  -3      Hip aBduction MMT R/L  -3      LEFS  15            RESTRICTIONS/PRECAUTIONS:  B PRINCESS     Exercises/Interventions:     Therapeutic Exercises (01944) Resistance / level Sets/sec Reps Notes   Bike/ Nustep  5 min  9/13 Fwds reciprocal   9/8 Backwards reciprocal   Half reciprocol  Seat 15 (in [place])   EOB A/A Flexion  8/24-added    Long Sit Calf Stretch with Strap   8/24-added    IB  HSS supine or seated     Standing HR   8/24-added    Quad sets  5\"H  15x     Heel slides with strap  10\"/1 sets x 10    ERMI  30\" 4 8/30 added    Bridging     8/30 added    Standing [address]   8/24-added   LAQ 1# 2 10 8/27 updated    SLR ecc 5 AAROM ; conc 5    8/30 added    Knee flexion lunge stretch on step  30\" 4 9/3   SAQ  2 10 9/7   Resisted hamstring curl Blue TB 1 10 9/10   SLR  2 5 9/10   Therapeutic Activities (40759)       Dressing change    9/8   Gait ed with RW, for upright posture, toe off, and flexion in swing phase    9/3   Step over raulito    9/7 cues to roll off toe   9/8 cues to heel strike   Assessment; Physical performance  40 min            Neuromuscular Re-ed (16530)       SAQ  8/24-added                                       Manual Intervention (65213)       Patella mobs  2 min     Tibia/fib mobs/ distraction EOB  4 min     PROM Flexion  6 min                              Modalities: 9/13, 9/10, 9/8 ,9/7, 9/3, 9/1, 8/30,8/27, 8/26, 8/24: VASO x 10'   mod pressure in long sit position    Pt. Education:  -patient educated on diagnosis, prognosis and expectations for rehab  -all patient questions were answered    Home Exercise Program:  Access Code: [email or web address]  URL: [email or web address]  Date: 08/20/2021  Prepared by: Rona Farley    Exercises  Supine Quad Set - 1 x daily - 7 x weekly - 3 sets - 10 reps  Supine Heel Slide with Strap - 1 x daily - 7 x weekly - 3 sets - 10 reps - 5 hold  Supine Hip Abduction - 1 x daily - 7 x weekly - 3 sets - 10 reps  Supine Bridge - 1 x daily - 7 x weekly - 3 sets - 10 reps        Therapeutic Exercise and NMR EXR  [x] (52545) Provided verbal/tactile cueing for activities related to strengthening, flexibility, endurance, ROM for improvements in  [x] LE / Lumbar: LE, proximal hip, and core control with self care, mobility, lifting, ambulation. [] UE / Cervical: cervical, postural, scapular, scapulothoracic and UE control with self care, reaching, carrying, lifting, house/yardwork, driving, computer work.  [] (08952) Provided verbal/tactile cueing for activities related to improving balance, coordination, kinesthetic sense, posture, motor skill, proprioception to assist with   [] LE / lumbar: LE, proximal hip, and core control in self care, mobility, lifting, ambulation and eccentric single leg control. [] UE / cervical: cervical, scapular, scapulothoracic and UE control with self care, reaching, carrying, lifting, house/yardwork, driving, computer work.   [] (15615) Therapist is in constant attendance of 2 or more patients providing skilled therapy interventions, but not providing any significant amount of measurable one-on-one time to either patient, for improvements in  [] LE / lumbar: LE, proximal hip, and core control in self care, mobility, lifting, ambulation and eccentric single leg control. [] UE / cervical: cervical, scapular, scapulothoracic and UE control with self care, reaching, carrying, lifting, house/yardwork, driving, computer work.      NMR and Therapeutic Activities:    [x] (21782 or 49867) Provided verbal/tactile cueing for activities related to improving balance, coordination, kinesthetic sense, posture, motor skill, proprioception and motor activation to allow for proper function of   [] LE: / Lumbar core, proximal hip and LE with self care and ADLs  [] UE / Cervical: cervical, postural, scapular, scapulothoracic and UE control with self care, carrying, lifting, driving, computer work.   [] (85255) Gait Re-education- Provided training and instruction to the patient for proper LE, core and proximal hip recruitment and positioning and eccentric body weight control with ambulation re-education including up and down stairs     Home Management Training / Self Care:  [] (31374) Provided self-care/home management training related to activities of daily living and compensatory training, and/or use of adaptive equipment for improvement with: ADLs and compensatory training, meal preparation, safety procedures and instruction in use of adaptive equipment, including bathing, grooming, dressing, personal hygiene, basic household cleaning and chores.      Home Exercise Program:    [x] (74606) Reviewed/Progressed HEP activities related to strengthening, flexibility, endurance, ROM of   [] LE / Lumbar: core, proximal hip and LE for functional self-care, mobility, lifting and ambulation/stair navigation   [] UE / Cervical: cervical, postural, scapular, scapulothoracic and UE control with self care, reaching, carrying, lifting, house/yardwork, driving, computer work  [] (02628)Reviewed/Progressed HEP activities related to improving balance, coordination, kinesthetic sense, posture, motor skill, proprioception of   [] LE: core, proximal hip and LE for self care, mobility, lifting, and ambulation/stair navigation    [] UE / Cervical: cervical, postural,  scapular, scapulothoracic and UE control with self care, reaching, carrying, lifting, house/yardwork, driving, computer work    Manual Treatments:  PROM / STM / Oscillations-Mobs:  G-I, II, III, IV (PA's, Inf., Post.)  [x] (54372) Provided manual therapy to mobilize LE, proximal hip and/or LS spine soft tissue/joints for the purpose of modulating pain, promoting relaxation,  increasing ROM, reducing/eliminating soft tissue swelling/inflammation/restriction, improving soft tissue extensibility and allowing for proper ROM for normal function with   [x] LE / lumbar: self care, mobility, lifting and ambulation. [] UE / Cervical: self care, reaching, carrying, lifting, house/yardwork, driving, computer work. Modalities:  [x] (64963) Vasopneumatic compression: Utilized vasopneumatic compression to decrease edema / swelling for the purpose of improving mobility and quad tone / recruitment which will allow for increased overall function including but not limited to self-care, transfers, ambulation, and ascending / descending stairs. Charges:  Timed Code Treatment Minutes: 50   Total Treatment Minutes: 60     [] EVAL - LOW (57954)   [] EVAL - MOD (45066)  [] EVAL - HIGH (42793)  [] RE-EVAL (09038)  [] LR(45875) x     [] Ionto  [] NMR (64211) x       [x] Vaso  [] Manual (31673) x      [] Ultrasound  [x] TA x 2     [] Mech Traction (67394)  [] Aquatic Therapy x     [] ES (un) (93440):   [] Home Management Training x  [] ES(attended) (64760)   [] Dry Needling 1-2 muscles (20542):  [] Dry Needling 3+ muscles (439305)  [] Group:      [x] Other: Physical performance x 1    GOALS:   Patient stated goal: to improve full range of motion,  []? Progressing: []? Met: []? Not Met: []? Adjusted     Therapist goals for Patient:   Short Term Goals: To be achieved in: 2 weeks  1. Independent in HEP and progression per patient tolerance, in order to prevent re-injury. []? Progressing: []? Met: []? Not Met: []? Adjusted  2. Patient will have a decrease in pain to facilitate improvement in movement, function, and ADLs as indicated by Functional Deficits. []? Progressing: []? Met: []? Not Met: []? Adjusted     Long Term Goals: To be achieved in: 7 weeks/ DC   1.  Disability index score of 20% or less for the LEFS to assist with reaching prior level of function. [x]? Progressing: []? Met: []? Not Met: []? Adjusted  2. Patient will demonstrate increased AROM to right knee flexion to 120 deg and knee extension 0 deg to allow for proper joint functioning as indicated by patients Functional Deficits. [x]? Progressing: []? Met: []? Not Met: []? Adjusted  3. Patient will demonstrate an increase in Strength to at least +4/5 as well as good proximal hip strength and control to allow for proper functional mobility as indicated by patients Functional Deficits. [x]? Progressing: []? Met: []? Not Met: []? Adjusted  4. Patient will return to functional activities including  ambulation without AD without increased symptoms or restriction. [x]? Progressing: []? Met: []? Not Met: []? Adjusted  5. Patient to be able to sleep through the night without symptoms . [x]? Progressing: []? Met: []? Not Met: []? Adjusted       Overall Progression Towards Functional goals/ Treatment Progress Update:  [] Patient is progressing as expected towards functional goals listed. [] Progression is slowed due to complexities/Impairments listed. [] Progression has been slowed due to co-morbidities. [x] Plan just implemented, too soon to assess goals progression <30days   [] Goals require adjustment due to lack of progress  [] Patient is not progressing as expected and requires additional follow up with physician  [] Other    Persisting Functional Limitations/Impairments:  []Sleeping []Sitting               []Standing [x]Transfers        [x]Walking []Kneeling               [x]Stairs [x]Squatting / bending   []ADLs []Reaching  []Lifting  []Housework  [x]Driving []Job related tasks  []Sports/Recreation []Other:        ASSESSMENT: Pt was able to demonstrate forwards reciprocal motion on the bike today. He continues to have mild to moderate swelling; however, his knee flexion and extension ROM has improved from 72 deg to 116 deg of knee flexion since initial eval on 8/20/21.  Pt was able to ambulate with an increased speed, distance, and improved knee flexion during terminal stance during the 6MWT. Pt demonstrated increased WBing in tandem stance with his L foot in front. The patient's LEFS improved from a 15 to 33 showing an increased independence with daily activities using an AD. Will continue to progress R knee flexion ROM, LE strength, and proper gait mechanics to restore PLOF. Treatment/Activity Tolerance:  [] Patient able to complete tx  [x] Patient limited by fatigue  [x] Patient limited by pain  [] Patient limited by other medical complications  [] Other:     Prognosis: [] Good [] Fair  [] Poor    Patient Requires Follow-up: [x] Yes  [] No    Plan for next treatment session:  Manual therapy, Knee flexion, gait mechanics    PLAN: See marquez. PT 3x / week for  7 weeks. [x] Continue per plan of care [] Alter current plan (see comments)  [] Plan of care initiated [] Hold pending MD visit [] Discharge    Electronically signed by: Keke Mcknight PT, DPT    Therapist was present, directed the patient's care, made skilled judgement, and was responsible for assessment and treatment of the patient. Joni Franco, SPT    Note: If patient does not return for scheduled/ recommended follow up visits, this note will serve as a discharge from care along with most recent update on progress.

## 2021-09-15 ENCOUNTER — HOSPITAL ENCOUNTER (OUTPATIENT)
Dept: PHYSICAL THERAPY | Age: 61
Setting detail: THERAPIES SERIES
Discharge: HOME | End: 2021-09-15
Payer: COMMERCIAL

## 2021-09-15 PROCEDURE — 97110 THERAPEUTIC EXERCISES: CPT

## 2021-09-15 PROCEDURE — 97116 GAIT TRAINING THERAPY: CPT

## 2021-09-15 NOTE — FLOWSHEET NOTE
[address] [facility] Physical Therapy  Phone: [phone]   Fax: [phone]      Physical Therapy Daily Treatment /Progress Note  Date:  9/15/2021    Patient Name:  Katie Clement    :  1960  MRN: [id number]  Medical/Treatment Diagnosis Information:  Diagnosis: M17.11 (ICD-10-CM) - Unilateral primary osteoarthritis, right knee, Z96.651 (ICD-10-CM) - Presence of right artificial knee joint  DOS: 21  Treatment Diagnosis: Decrease knee AROM , difficulty walking, imbalance  Insurance/Certification information:  PT Insurance Information: [facility]  Physician Information:  Referring Practitioner: Saqib Mccarty MD  Plan of care signed (Y/N): []  Yes [x]  No     Date of Patient follow up with Physician: 21     Progress Report: [x]  Yes  []  No     Date Range for reporting period:  Beginnin21  Ending:     Progress report due (10 Rx/or 30 days whichever is less): visit #86      Recertification due (POC duration/ or 90 days whichever is less): visit # 21    Visit # Insurance Allowable Auth required? Date Range    Mn []  Yes  [x]  No NA       Latex Allergy:  [x]NO      []YES  Preferred Language for Healthcare:   [x]English       []other:    Functional Scale:       Date assessed:  LEFS: raw score = 33/80; dysfunction = 59%  21  LEFS: raw score =15 /80; dysfunction = 81%  21    Pain level:  310     SUBJECTIVE:  Pt states he feels good today with minimal pain. Still has some swelling in his R knee. OBJECTIVE:    9/15: AAROM R knee flexion (supine with strap): 117 deg. Pt received verbal cueing for WBing through R leg and flexing knee during stance phase of gait. Walks with 4 point cane now. : AAROM R knee flexion (supine with strap): 116 deg, R knee extension: -1 deg    9/10: AAROM knee flexion (supine with strap): 117 deg    : Pt received verbal/tactile cueing for heel strike during gait ed.  Achieved 106 deg of AAROM R knee flexion w/ strap during heel slide. Took off bandage and replaced with Tegaderm gauze.     9/7: PROM 104 deg      Not  prehab just using values as baseline   Functional testing Pre hab        Date   eval post op   Date 8/20/21 4 week f/u   9/14 8 week f/u  10/12 D/c            TUG  TBD      30 second sit to stand  TBD      6 minute walk  244 287     Balance        Narrow NIMCO  30s 30s     Semi tandem        Tandem   R 30s, L 24s R 30s, L 26s     SLS        Knee AROM  Flex 72, ext +2 Flex 116, ext -1      Knee Extension MMT R/L  -3      Hip aBduction MMT R/L  -3      LEFS  15            RESTRICTIONS/PRECAUTIONS:  B PRINCESS     Exercises/Interventions:     Therapeutic Exercises (34009) Resistance / level Sets/sec Reps Notes   Bike/ Nustep  5 min  9/13 Fwds reciprocal   9/8 Backwards reciprocal   Half reciprocol  Seat 15 (in [place])   EOB A/A Flexion  8/24-added    Long Sit Calf Stretch with Strap   8/24-added    IB  HSS supine or seated     Standing HR   8/24-added    Quad sets  5\"H  15x     Heel slides with strap  10\"/1 sets x 10    ERMI  8/30 added    Bridging     8/30 added    Standing [address]   8/24-added   LAQ 1# 8/27 updated    SLR ecc 5 AAROM ; conc 5    8/30 added    Knee flexion lunge stretch on step  9/3   SAQ  9/7   Resisted hamstring curl Blue TB 1 10 9/10   SLR  2 5 9/10   Therapeutic Activities (09451)       Dressing change    9/8   Gait ed with RW, for upright posture, toe off, and flexion in swing phase    9/3   Step over raulito     bars step over 3 hurdles   3 laps  9/7 cues to roll off toe   9/8 cues to heel strike  9/14 Cues for WBing on R leg during stance phase   Assessment; Physical performance              Neuromuscular Re-ed (04673)       SAQ  8/24-added                                       Manual Intervention (06476)       Patella mobs  2 min     Tibia/fib mobs/ distraction EOB  4 min     PROM Flexion  6 min     Knee Flexion contract/relax EOB  3 min  9/15 Added                     Modalities: 9/13, 9/10, 9/8 ,9/7, 9/3, 9/1, 8/30,8/27, 8/26, 8/24: VASO x 10'   mod pressure in long sit position    Pt. Education:  -patient educated on diagnosis, prognosis and expectations for rehab  -all patient questions were answered    Home Exercise Program:  Access Code: [email or web address]  URL: [email or web address]  Date: 08/20/2021  Prepared by: Varghese Licona    Exercises  Supine Quad Set - 1 x daily - 7 x weekly - 3 sets - 10 reps  Supine Heel Slide with Strap - 1 x daily - 7 x weekly - 3 sets - 10 reps - 5 hold  Supine Hip Abduction - 1 x daily - 7 x weekly - 3 sets - 10 reps  Supine Bridge - 1 x daily - 7 x weekly - 3 sets - 10 reps        Therapeutic Exercise and NMR EXR  [x] (60943) Provided verbal/tactile cueing for activities related to strengthening, flexibility, endurance, ROM for improvements in  [x] LE / Lumbar: LE, proximal hip, and core control with self care, mobility, lifting, ambulation. [] UE / Cervical: cervical, postural, scapular, scapulothoracic and UE control with self care, reaching, carrying, lifting, house/yardwork, driving, computer work.  [] (59779) Provided verbal/tactile cueing for activities related to improving balance, coordination, kinesthetic sense, posture, motor skill, proprioception to assist with   [] LE / lumbar: LE, proximal hip, and core control in self care, mobility, lifting, ambulation and eccentric single leg control. [] UE / cervical: cervical, scapular, scapulothoracic and UE control with self care, reaching, carrying, lifting, house/yardwork, driving, computer work.   [] (15905) Therapist is in constant attendance of 2 or more patients providing skilled therapy interventions, but not providing any significant amount of measurable one-on-one time to either patient, for improvements in  [] LE / lumbar: LE, proximal hip, and core control in self care, mobility, lifting, ambulation and eccentric single leg control.    [] UE / cervical: cervical, scapular, scapulothoracic and UE control with self control with self care, reaching, carrying, lifting, house/yardwork, driving, computer work    Manual Treatments:  PROM / STM / Oscillations-Mobs:  G-I, II, III, IV (PA's, Inf., Post.)  [x] (30618) Provided manual therapy to mobilize LE, proximal hip and/or LS spine soft tissue/joints for the purpose of modulating pain, promoting relaxation,  increasing ROM, reducing/eliminating soft tissue swelling/inflammation/restriction, improving soft tissue extensibility and allowing for proper ROM for normal function with   [x] LE / lumbar: self care, mobility, lifting and ambulation. [] UE / Cervical: self care, reaching, carrying, lifting, house/yardwork, driving, computer work. Modalities:  [x] (12616) Vasopneumatic compression: Utilized vasopneumatic compression to decrease edema / swelling for the purpose of improving mobility and quad tone / recruitment which will allow for increased overall function including but not limited to self-care, transfers, ambulation, and ascending / descending stairs. Charges:  Timed Code Treatment Minutes: 42   Total Treatment Minutes: 42     [] EVAL - LOW (75396)   [] EVAL - MOD (64149)  [] EVAL - HIGH (12606)  [] RE-EVAL (58574)  [x] RI(39837) x 2    [] Ionto  [] NMR (06238) x       [] Vaso  [] Manual (42005) x      [] Ultrasound  [] TA x      [] Mech Traction (44110)  [] Aquatic Therapy x     [] ES (un) (96558):   [] Home Management Training x  [] ES(attended) (72508)   [] Dry Needling 1-2 muscles (99234):  [] Dry Needling 3+ muscles (136299)  [] Group:      [x] Other: Gait      GOALS:   Patient stated goal: to improve full range of motion,  []? Progressing: []? Met: []? Not Met: []? Adjusted     Therapist goals for Patient:   Short Term Goals: To be achieved in: 2 weeks  1. Independent in HEP and progression per patient tolerance, in order to prevent re-injury. []? Progressing: []? Met: []? Not Met: []? Adjusted  2.  Patient will have a decrease in pain to facilitate improvement in movement, function, and ADLs as indicated by Functional Deficits. []? Progressing: []? Met: []? Not Met: []? Adjusted     Long Term Goals: To be achieved in: 7 weeks/ DC   1. Disability index score of 20% or less for the LEFS to assist with reaching prior level of function. [x]? Progressing: []? Met: []? Not Met: []? Adjusted  2. Patient will demonstrate increased AROM to right knee flexion to 120 deg and knee extension 0 deg to allow for proper joint functioning as indicated by patients Functional Deficits. [x]? Progressing: []? Met: []? Not Met: []? Adjusted  3. Patient will demonstrate an increase in Strength to at least +4/5 as well as good proximal hip strength and control to allow for proper functional mobility as indicated by patients Functional Deficits. [x]? Progressing: []? Met: []? Not Met: []? Adjusted  4. Patient will return to functional activities including  ambulation without AD without increased symptoms or restriction. [x]? Progressing: []? Met: []? Not Met: []? Adjusted  5. Patient to be able to sleep through the night without symptoms . [x]? Progressing: []? Met: []? Not Met: []? Adjusted       Overall Progression Towards Functional goals/ Treatment Progress Update:  [] Patient is progressing as expected towards functional goals listed. [] Progression is slowed due to complexities/Impairments listed. [] Progression has been slowed due to co-morbidities.   [x] Plan just implemented, too soon to assess goals progression <30days   [] Goals require adjustment due to lack of progress  [] Patient is not progressing as expected and requires additional follow up with physician  [] Other    Persisting Functional Limitations/Impairments:  []Sleeping []Sitting               []Standing [x]Transfers        [x]Walking []Kneeling               [x]Stairs [x]Squatting / bending   []ADLs []Reaching  []Lifting  []Housework  [x]Driving []Job related tasks  []Sports/Recreation []Other:        ASSESSMENT: 9/15: Pt has progressed from ambulating from a front wheel walker to a cane. He still presents with mild to moderate swelling. Pt tolerated quad control and hamstring curl exercises with resistance. Worked on gait training with a focus on [address] through the R LE to initiate proper heel contact, knee flexion during mid stance, and toe off during terminal stance. Added contract/relax for knee flexion with good response. Will continue to progress R knee flexion ROM, LE strength, and proper gait mechanics to restore PLOF. Treatment/Activity Tolerance:  [] Patient able to complete tx  [x] Patient limited by fatigue  [x] Patient limited by pain  [] Patient limited by other medical complications  [] Other:     Prognosis: [] Good [] Fair  [] Poor    Patient Requires Follow-up: [x] Yes  [] No    Plan for next treatment session:  Manual therapy, Knee flexion, gait mechanics    PLAN: See [facility]. PT 3x / week for  7 weeks. [x] Continue per plan of care [] Alter current plan (see comments)  [] Plan of care initiated [] Hold pending MD visit [] Discharge    Electronically signed by: Varghese Licona, PT, DPT    Therapist was present, directed the patient's care, made skilled judgement, and was responsible for assessment and treatment of the patient. Alley Estrada, SPT    Note: If patient does not return for scheduled/ recommended follow up visits, this note will serve as a discharge from care along with most recent update on progress.

## 2021-09-17 ENCOUNTER — HOSPITAL ENCOUNTER (OUTPATIENT)
Dept: PHYSICAL THERAPY | Age: 61
Setting detail: THERAPIES SERIES
Discharge: HOME | End: 2021-09-17
Payer: COMMERCIAL

## 2021-09-17 PROCEDURE — 97110 THERAPEUTIC EXERCISES: CPT

## 2021-09-17 PROCEDURE — 97140 MANUAL THERAPY 1/> REGIONS: CPT

## 2021-09-17 PROCEDURE — 97016 VASOPNEUMATIC DEVICE THERAPY: CPT

## 2021-09-17 NOTE — FLOWSHEET NOTE
Achieved 106 deg of AAROM R knee flexion w/ strap during heel slide. Took off bandage and replaced with Tegaderm gauze.     9/7: PROM 104 deg      Not  prehab just using values as baseline   Functional testing Pre hab        Date   eval post op   Date 8/20/21 4 week f/u   9/14 8 week f/u  10/12 D/c            TUG  TBD 17.4      30 second sit to stand  TBD 0  (8 with UE assist)     6 minute walk  244 287     Balance        Narrow NIMCO  30s 30s     Semi tandem        Tandem   R 30s, L 24s R 30s, L 26s     SLS        Knee AROM  Flex 72, ext +2 Flex 116, ext -1      Knee Extension MMT R/L  -3 46#/4+     Hip aBduction MMT R/L  -3      LEFS  15 33           RESTRICTIONS/PRECAUTIONS:  B PRINCESS     Exercises/Interventions:     Therapeutic Exercises (84594) Resistance / level Sets/sec Reps Notes   Bike/ Nustep  4 min  9/13 Fwds reciprocal   9/8 Backwards reciprocal   Half reciprocol  Seat 14 (in [place])   EOB A/A Flexion  8/24-added    Long Sit Calf Stretch with Strap   8/24-added    IB  HSS supine or seated     Standing HR   8/24-added    Quad sets     Heel slides with strap     ERMI  30\" 4 8/30 added    Bridging     8/30 added    Standing [address]   8/24-added   LAQ 3# 2 10 8/27 updated    SLR ecc 5 AAROM ; conc 5    8/30 added    Knee flexion lunge stretch on step  30\" 3 9/3   SAQ  9/7   Resisted hamstring curl Blue TB 1 10 9/10   SLR  2 8 9/10          Leg Press 90# 2 10 9/17   Leg Ext npv      Leg Curl npv                    Therapeutic Activities (74759)       Dressing change    9/8   Gait ed with RW, for upright posture, toe off, and flexion in swing phase    9/3   Step over raulito     bars step over 3 hurdles     9/7 cues to roll off toe   9/8 cues to heel strike  9/14 Cues for WBing on R leg during stance phase   Assessment; Physical performance       Step ups npv      Neuromuscular Re-ed (61592)       SAQ  8/24-added                                       Manual Intervention (35173)       Patella mobs      Tibia/fib mobs/ distraction EOB      PROM Flexion  6 min     Knee Flexion contract/relax EOB    9/15 Added   Scar mobilization  2 min                Modalities: 9/17, 9/13, 9/10, 9/8 ,9/7, 9/3, 9/1, 8/30,8/27, 8/26, 8/24: VASO x 10'   mod pressure in long sit position    Pt. Education:  -patient educated on diagnosis, prognosis and expectations for rehab  -all patient questions were answered    Home Exercise Program:  Access Code: [email or web address]  URL: [email or web address]  Date: 08/20/2021  Prepared by: Noe Rodriguez    Exercises  Supine Quad Set - 1 x daily - 7 x weekly - 3 sets - 10 reps  Supine Heel Slide with Strap - 1 x daily - 7 x weekly - 3 sets - 10 reps - 5 hold  Supine Hip Abduction - 1 x daily - 7 x weekly - 3 sets - 10 reps  Supine Bridge - 1 x daily - 7 x weekly - 3 sets - 10 reps        Therapeutic Exercise and NMR EXR  [x] (08128) Provided verbal/tactile cueing for activities related to strengthening, flexibility, endurance, ROM for improvements in  [x] LE / Lumbar: LE, proximal hip, and core control with self care, mobility, lifting, ambulation. [] UE / Cervical: cervical, postural, scapular, scapulothoracic and UE control with self care, reaching, carrying, lifting, house/yardwork, driving, computer work.  [] (64452) Provided verbal/tactile cueing for activities related to improving balance, coordination, kinesthetic sense, posture, motor skill, proprioception to assist with   [] LE / lumbar: LE, proximal hip, and core control in self care, mobility, lifting, ambulation and eccentric single leg control.    [] UE / cervical: cervical, scapular, scapulothoracic and UE control with self care, reaching, carrying, lifting, house/yardwork, driving, computer work.   [] (04576) Therapist is in constant attendance of 2 or more patients providing skilled therapy interventions, but not providing any significant amount of measurable one-on-one time to either patient, for improvements in  [] LE / lumbar: LE, proximal hip, and core control in self care, mobility, lifting, ambulation and eccentric single leg control. [] UE / cervical: cervical, scapular, scapulothoracic and UE control with self care, reaching, carrying, lifting, house/yardwork, driving, computer work. NMR and Therapeutic Activities:    [x] (66335 or 21213) Provided verbal/tactile cueing for activities related to improving balance, coordination, kinesthetic sense, posture, motor skill, proprioception and motor activation to allow for proper function of   [] LE: / Lumbar core, proximal hip and LE with self care and ADLs  [] UE / Cervical: cervical, postural, scapular, scapulothoracic and UE control with self care, carrying, lifting, driving, computer work.   [] (65519) Gait Re-education- Provided training and instruction to the patient for proper LE, core and proximal hip recruitment and positioning and eccentric body weight control with ambulation re-education including up and down stairs     Home Management Training / Self Care:  [] (48359) Provided self-care/home management training related to activities of daily living and compensatory training, and/or use of adaptive equipment for improvement with: ADLs and compensatory training, meal preparation, safety procedures and instruction in use of adaptive equipment, including bathing, grooming, dressing, personal hygiene, basic household cleaning and chores.      Home Exercise Program:    [x] (37827) Reviewed/Progressed HEP activities related to strengthening, flexibility, endurance, ROM of   [] LE / Lumbar: core, proximal hip and LE for functional self-care, mobility, lifting and ambulation/stair navigation   [] UE / Cervical: cervical, postural, scapular, scapulothoracic and UE control with self care, reaching, carrying, lifting, house/yardwork, driving, computer work  [] (26917)Reviewed/Progressed HEP activities related to improving balance, coordination, kinesthetic sense, posture, motor skill, proprioception of [] LE: core, proximal hip and LE for self care, mobility, lifting, and ambulation/stair navigation    [] UE / Cervical: cervical, postural,  scapular, scapulothoracic and UE control with self care, reaching, carrying, lifting, house/yardwork, driving, computer work    Manual Treatments:  PROM / STM / Oscillations-Mobs:  G-I, II, III, IV (PA's, Inf., Post.)  [x] (14906) Provided manual therapy to mobilize LE, proximal hip and/or LS spine soft tissue/joints for the purpose of modulating pain, promoting relaxation,  increasing ROM, reducing/eliminating soft tissue swelling/inflammation/restriction, improving soft tissue extensibility and allowing for proper ROM for normal function with   [x] LE / lumbar: self care, mobility, lifting and ambulation. [] UE / Cervical: self care, reaching, carrying, lifting, house/yardwork, driving, computer work. Modalities:  [x] (78499) Vasopneumatic compression: Utilized vasopneumatic compression to decrease edema / swelling for the purpose of improving mobility and quad tone / recruitment which will allow for increased overall function including but not limited to self-care, transfers, ambulation, and ascending / descending stairs. Charges:  Timed Code Treatment Minutes: 48   Total Treatment Minutes: 62     [] EVAL - LOW (04689)   [] EVAL - MOD (58387)  [] EVAL - HIGH (62984)  [] RE-EVAL (75830)  [x] WI(32755) x 2    [] Ionto  [] NMR (32142) x       [x] Vaso  [x] Manual (85724) x  1    [] Ultrasound  [] TA x      [] Mech Traction (17365)  [] Aquatic Therapy x     [] ES (un) (70581):   [] Home Management Training x  [] ES(attended) (61249)   [] Dry Needling 1-2 muscles (52280):  [] Dry Needling 3+ muscles (459196)  [] Group:      [] Other: Gait      GOALS:   Patient stated goal: to improve full range of motion,  []? Progressing: []? Met: []? Not Met: []? Adjusted     Therapist goals for Patient:   Short Term Goals: To be achieved in: 2 weeks  1.  Independent in HEP and progression per patient tolerance, in order to prevent re-injury. []? Progressing: []? Met: []? Not Met: []? Adjusted  2. Patient will have a decrease in pain to facilitate improvement in movement, function, and ADLs as indicated by Functional Deficits. []? Progressing: []? Met: []? Not Met: []? Adjusted     Long Term Goals: To be achieved in: 7 weeks/ DC   1. Disability index score of 20% or less for the LEFS to assist with reaching prior level of function. [x]? Progressing: []? Met: []? Not Met: []? Adjusted  2. Patient will demonstrate increased AROM to right knee flexion to 120 deg and knee extension 0 deg to allow for proper joint functioning as indicated by patients Functional Deficits. [x]? Progressing: []? Met: []? Not Met: []? Adjusted  3. Patient will demonstrate an increase in Strength to at least +4/5 as well as good proximal hip strength and control to allow for proper functional mobility as indicated by patients Functional Deficits. [x]? Progressing: []? Met: []? Not Met: []? Adjusted  4. Patient will return to functional activities including  ambulation without AD without increased symptoms or restriction. [x]? Progressing: []? Met: []? Not Met: []? Adjusted  5. Patient to be able to sleep through the night without symptoms . [x]? Progressing: []? Met: []? Not Met: []? Adjusted       Overall Progression Towards Functional goals/ Treatment Progress Update:  [] Patient is progressing as expected towards functional goals listed. [] Progression is slowed due to complexities/Impairments listed. [] Progression has been slowed due to co-morbidities.   [x] Plan just implemented, too soon to assess goals progression <30days   [] Goals require adjustment due to lack of progress  [] Patient is not progressing as expected and requires additional follow up with physician  [] Other    Persisting Functional Limitations/Impairments:  []Sleeping []Sitting               []Standing [x]Transfers        [x]Walking []Kneeling               [x]Stairs [x]Squatting / bending   []ADLs []Reaching  []Lifting  []Housework  [x]Driving []Job related tasks  []Sports/Recreation []Other:        ASSESSMENT: Pt achieved 120 deg of PROM flexion this date. Is able to perform SLR without extensor lag this date. Requested pt obtain SPC to decrease fall risk vs SBQC. Pt continues to require frequent cueing to maintain heel to toe gait pattern. Plan to maintain gains in ROM and progress functional strength training as able to tolerate. Treatment/Activity Tolerance:  [] Patient able to complete tx  [x] Patient limited by fatigue  [x] Patient limited by pain  [] Patient limited by other medical complications  [] Other:     Prognosis: [] Good [] Fair  [] Poor    Patient Requires Follow-up: [x] Yes  [] No    Plan for next treatment session:  Manual therapy, Knee flexion, gait mechanics    PLAN: See eval. PT 3x / week for  7 weeks. [x] Continue per plan of care [] Alter current plan (see comments)  [] Plan of care initiated [] Hold pending MD visit [] Discharge    Electronically signed by: Antwon Harris PT, DPT      Note: If patient does not return for scheduled/ recommended follow up visits, this note will serve as a discharge from care along with most recent update on progress.

## 2021-09-20 ENCOUNTER — HOSPITAL ENCOUNTER (OUTPATIENT)
Dept: PHYSICAL THERAPY | Age: 61
Setting detail: THERAPIES SERIES
Discharge: HOME | End: 2021-09-20
Payer: COMMERCIAL

## 2021-09-20 NOTE — FLOWSHEET NOTE
Physical Therapy  Cancellation/No-show Note  Patient Name:  Jessy Alexandra  :  1960   Date:  2021  Cancelled visits to date: 1   No-shows to date: 0    Patient status for today's appointment patient:  [x]  Cancelled   []  Rescheduled appointment  []  No-show     Reason given by patient:  []  Patient ill  []  Conflicting appointment  []  No transportation    []  Conflict with work  [x]  No reason given   []  Other:     Comments:      Phone call information:   []  Phone call made today to patient at _ time at number provided:      []  Patient answered, conversation as follows:    []  Patient did not answer, message left as follows:  []  Phone call not made today  [x]  Phone call not needed - pt contacted us to cancel and provided reason for cancellation.      Electronically signed by:  Damari Kaba PT

## 2021-09-22 ENCOUNTER — HOSPITAL ENCOUNTER (OUTPATIENT)
Dept: PHYSICAL THERAPY | Age: 61
Setting detail: THERAPIES SERIES
Discharge: HOME | End: 2021-09-22
Payer: COMMERCIAL

## 2021-09-22 PROCEDURE — 97110 THERAPEUTIC EXERCISES: CPT

## 2021-09-22 PROCEDURE — 97140 MANUAL THERAPY 1/> REGIONS: CPT

## 2021-09-22 NOTE — FLOWSHEET NOTE
[facility]  Phone: [phone]   Fax: [phone]      Physical Therapy Daily Treatment /Progress Note  Date:  2021    Patient Name:  Christian Ardon    :  1960  MRN: [id number]  Medical/Treatment Diagnosis Information:  Diagnosis: M17.11 (ICD-10-CM) - Unilateral primary osteoarthritis, right knee, Z96.651 (ICD-10-CM) - Presence of right artificial knee joint  DOS: 21  Treatment Diagnosis: Decrease knee AROM , difficulty walking, imbalance  Insurance/Certification information:  PT Insurance Information: [facility]  Physician Information:  Referring Practitioner: Debbie Arroyo MD  Plan of care signed (Y/N): []  Yes [x]  No     Date of Patient follow up with Physician: 21     Progress Report: [x]  Yes  []  No     Date Range for reporting period:  Beginnin21  Ending:     Progress report due (10 Rx/or 30 days whichever is less): visit #20      Recertification due (POC duration/ or 90 days whichever is less): visit # 21    Visit # Insurance Allowable Auth required? Date Range    Mn []  Yes  [x]  No NA       Latex Allergy:  [x]NO      []YES  Preferred Language for Healthcare:   [x]English       []other:    Functional Scale:       Date assessed:  LEFS: raw score = 33/80; dysfunction = 59%  21  LEFS: raw score =15 /80; dysfunction = 81%  21    Pain level:  4-5/10     SUBJECTIVE:  Pt is still having swelling around R knee and is warm to touch. Pt states he gets some sharp pains that come and go, but otherwise the pain isn't bad and stays at a pretty consistent level. OBJECTIVE:     : PROM at [facility] 121 deg    : PROM at [facility] 120 deg    9/15: AAROM R knee flexion (supine with strap): 117 deg. Pt received verbal cueing for WBing through R leg and flexing knee during stance phase of gait. Walks with 4 point cane now.      : AAROM R knee flexion (supine with strap): 116 deg, R knee extension: -1 deg    9/10: AAROM knee flexion (supine with strap): 117 deg    9/8: Pt received verbal/tactile cueing for heel strike during gait ed. Achieved 106 deg of AAROM R knee flexion w/ strap during heel slide. Took off bandage and replaced with Tegaderm gauze.     9/7: PROM 104 deg      Not  prehab just using values as baseline   Functional testing Pre hab        Date   eval post op   Date 8/20/21 4 week f/u   9/14 8 week f/u  10/12 D/c            TUG  TBD 17.4      30 second sit to stand  TBD 0  (8 with UE assist)     6 minute walk  244 287     Balance        Narrow NIMCO  30s 30s     Semi tandem        Tandem   R 30s, L 24s R 30s, L 26s     SLS        Knee AROM  Flex 72, ext +2 Flex 116, ext -1      Knee Extension MMT R/L  -3 46#/4+     Hip aBduction MMT R/L  -3      LEFS  15 33           RESTRICTIONS/PRECAUTIONS:  B PRINCESS     Exercises/Interventions:     Therapeutic Exercises (00776) Resistance / level Sets/sec Reps Notes   Bike/ Nustep  4 min  9/13 Fwds reciprocal   9/8 Backwards reciprocal   Half reciprocol  Seat 14 (in [place])   EOB A/A Flexion  8/24-added    Long Sit Calf Stretch with Strap   8/24-added    IB  HSS supine or seated     Standing HR   8/24-added    Quad sets     Heel slides with strap     ERMI  30\" 4 8/30 added    Bridging     8/30 added    Standing [address]   8/24-added   LAQ 3# 2 10 8/27 updated    SLR ecc 5 AAROM ; conc 5    8/30 added    Knee flexion lunge stretch on step  30\" 3 9/3   SAQ  9/7   Resisted hamstring curl Blue TB 9/10   SLR  9/10          Leg Press 90# 2 10 9/17   Leg Ext npv      Leg Curl 30#   9/22 Added                 Therapeutic Activities (80546)       Dressing change    9/8   Gait ed with RW, for upright posture, toe off, and flexion in swing phase    9/3   Step over raulito     bars step over 3 hurdles     9/7 cues to roll off toe   9/8 cues to heel strike  9/14 Cues for WBing on R leg during stance phase   Assessment; Physical performance       Step ups npv      Neuromuscular Re-ed (83543)       SAQ 8/24-added                                       Manual Intervention (34856)       Patella mobs  5 min    Tibia/fib mobs/ distraction EOB      PROM Flexion       Knee Flexion contract/relax EOB  3 min  9/15 Added   Scar mobilization    Showed for home              Modalities: 9/17, 9/13, 9/10, 9/8 ,9/7, 9/3, 9/1, 8/30,8/27, 8/26, 8/24: VASO x 10'   mod pressure in long sit position    Pt. Education:  -patient educated on diagnosis, prognosis and expectations for rehab  -all patient questions were answered    Home Exercise Program:  Access Code: [email or web address]  URL: [email or web address]  Date: 08/20/2021  Prepared by: Rona Farley    Exercises  Supine Quad Set - 1 x daily - 7 x weekly - 3 sets - 10 reps  Supine Heel Slide with Strap - 1 x daily - 7 x weekly - 3 sets - 10 reps - 5 hold  Supine Hip Abduction - 1 x daily - 7 x weekly - 3 sets - 10 reps  Supine Bridge - 1 x daily - 7 x weekly - 3 sets - 10 reps        Therapeutic Exercise and NMR EXR  [x] (90588) Provided verbal/tactile cueing for activities related to strengthening, flexibility, endurance, ROM for improvements in  [x] LE / Lumbar: LE, proximal hip, and core control with self care, mobility, lifting, ambulation. [] UE / Cervical: cervical, postural, scapular, scapulothoracic and UE control with self care, reaching, carrying, lifting, house/yardwork, driving, computer work.  [] (48280) Provided verbal/tactile cueing for activities related to improving balance, coordination, kinesthetic sense, posture, motor skill, proprioception to assist with   [] LE / lumbar: LE, proximal hip, and core control in self care, mobility, lifting, ambulation and eccentric single leg control.    [] UE / cervical: cervical, scapular, scapulothoracic and UE control with self care, reaching, carrying, lifting, house/yardwork, driving, computer work.   [] (44425) Therapist is in constant attendance of 2 or more patients providing skilled therapy interventions, but not providing any significant amount of measurable one-on-one time to either patient, for improvements in  [] LE / lumbar: LE, proximal hip, and core control in self care, mobility, lifting, ambulation and eccentric single leg control. [] UE / cervical: cervical, scapular, scapulothoracic and UE control with self care, reaching, carrying, lifting, house/yardwork, driving, computer work. NMR and Therapeutic Activities:    [x] (84305 or 88203) Provided verbal/tactile cueing for activities related to improving balance, coordination, kinesthetic sense, posture, motor skill, proprioception and motor activation to allow for proper function of   [] LE: / Lumbar core, proximal hip and LE with self care and ADLs  [] UE / Cervical: cervical, postural, scapular, scapulothoracic and UE control with self care, carrying, lifting, driving, computer work.   [] (60930) Gait Re-education- Provided training and instruction to the patient for proper LE, core and proximal hip recruitment and positioning and eccentric body weight control with ambulation re-education including up and down stairs     Home Management Training / Self Care:  [] (04569) Provided self-care/home management training related to activities of daily living and compensatory training, and/or use of adaptive equipment for improvement with: ADLs and compensatory training, meal preparation, safety procedures and instruction in use of adaptive equipment, including bathing, grooming, dressing, personal hygiene, basic household cleaning and chores.      Home Exercise Program:    [x] (97387) Reviewed/Progressed HEP activities related to strengthening, flexibility, endurance, ROM of   [] LE / Lumbar: core, proximal hip and LE for functional self-care, mobility, lifting and ambulation/stair navigation   [] UE / Cervical: cervical, postural, scapular, scapulothoracic and UE control with self care, reaching, carrying, lifting, house/yardwork, driving, computer work  [] (87731)Reviewed/Progressed HEP activities related to improving balance, coordination, kinesthetic sense, posture, motor skill, proprioception of   [] LE: core, proximal hip and LE for self care, mobility, lifting, and ambulation/stair navigation    [] UE / Cervical: cervical, postural,  scapular, scapulothoracic and UE control with self care, reaching, carrying, lifting, house/yardwork, driving, computer work    Manual Treatments:  PROM / STM / Oscillations-Mobs:  G-I, II, III, IV (PA's, Inf., Post.)  [x] (89463) Provided manual therapy to mobilize LE, proximal hip and/or LS spine soft tissue/joints for the purpose of modulating pain, promoting relaxation,  increasing ROM, reducing/eliminating soft tissue swelling/inflammation/restriction, improving soft tissue extensibility and allowing for proper ROM for normal function with   [x] LE / lumbar: self care, mobility, lifting and ambulation. [] UE / Cervical: self care, reaching, carrying, lifting, house/yardwork, driving, computer work. Modalities:  [x] (48213) Vasopneumatic compression: Utilized vasopneumatic compression to decrease edema / swelling for the purpose of improving mobility and quad tone / recruitment which will allow for increased overall function including but not limited to self-care, transfers, ambulation, and ascending / descending stairs. Charges:  Timed Code Treatment Minutes: 49   Total Treatment Minutes: 49     [] EVAL - LOW (24653)   [] EVAL - MOD (87440)  [] EVAL - HIGH (05252)  [] RE-EVAL (41744)  [x] NJ(52077) x 2    [] Ionto  [] NMR (86802) x       [] Vaso  [x] Manual (98143) x  1    [] Ultrasound  [] TA x      [] Mech Traction (19702)  [] Aquatic Therapy x     [] ES (un) (84264):   [] Home Management Training x  [] ES(attended) (81626)   [] Dry Needling 1-2 muscles (04180):  [] Dry Needling 3+ muscles (101783)  [] Group:      [] Other: Gait      GOALS:   Patient stated goal: to improve full range of motion,  []?  Progressing: []? Met: []? Not Met: []? Adjusted     Therapist goals for Patient:   Short Term Goals: To be achieved in: 2 weeks  1. Independent in HEP and progression per patient tolerance, in order to prevent re-injury. []? Progressing: []? Met: []? Not Met: []? Adjusted  2. Patient will have a decrease in pain to facilitate improvement in movement, function, and ADLs as indicated by Functional Deficits. []? Progressing: []? Met: []? Not Met: []? Adjusted     Long Term Goals: To be achieved in: 7 weeks/ DC   1. Disability index score of 20% or less for the LEFS to assist with reaching prior level of function. [x]? Progressing: []? Met: []? Not Met: []? Adjusted  2. Patient will demonstrate increased AROM to right knee flexion to 120 deg and knee extension 0 deg to allow for proper joint functioning as indicated by patients Functional Deficits. [x]? Progressing: []? Met: []? Not Met: []? Adjusted  3. Patient will demonstrate an increase in Strength to at least +4/5 as well as good proximal hip strength and control to allow for proper functional mobility as indicated by patients Functional Deficits. [x]? Progressing: []? Met: []? Not Met: []? Adjusted  4. Patient will return to functional activities including  ambulation without AD without increased symptoms or restriction. [x]? Progressing: []? Met: []? Not Met: []? Adjusted  5. Patient to be able to sleep through the night without symptoms . [x]? Progressing: []? Met: []? Not Met: []? Adjusted       Overall Progression Towards Functional goals/ Treatment Progress Update:  [] Patient is progressing as expected towards functional goals listed. [] Progression is slowed due to complexities/Impairments listed. [] Progression has been slowed due to co-morbidities.   [x] Plan just implemented, too soon to assess goals progression <30days   [] Goals require adjustment due to lack of progress  [] Patient is not progressing as expected and requires additional follow up with physician  [] Other    Persisting Functional Limitations/Impairments:  []Sleeping []Sitting               []Standing [x]Transfers        [x]Walking []Kneeling               [x]Stairs [x]Squatting / bending   []ADLs []Reaching  []Lifting  []Housework  [x]Driving []Job related tasks  []Sports/Recreation []Other:        ASSESSMENT: 9/22: Pt achieved 121 deg of PROM with the ERMI today. Added hamstring curls using the machine with good response. Pt continues to require frequent cueing of heel to toe gait pattern and R knee flexion during swing phase. He is now ambulating with a SPC. Educated pt on performing self scar mobilizations at home. Pt will continue to benefit from skilled therapy in order to progress functional strength and restore proper gait pattern as tolerated. Treatment/Activity Tolerance:  [] Patient able to complete tx  [x] Patient limited by fatigue  [x] Patient limited by pain  [] Patient limited by other medical complications  [] Other:     Prognosis: [] Good [] Fair  [] Poor    Patient Requires Follow-up: [x] Yes  [] No    Plan for next treatment session:  Manual therapy, Knee flexion, gait mechanics    PLAN: See [facility]. PT 3x / week for  7 weeks. [x] Continue per plan of care [] Alter current plan (see comments)  [] Plan of care initiated [] Hold pending MD visit [] Discharge    Electronically signed by: Keke Mcknight, PT, DPT    Therapist was present, directed the patient's care, made skilled judgement, and was responsible for assessment and treatment of the patient. Joni Franco, SPT    Note: If patient does not return for scheduled/ recommended follow up visits, this note will serve as a discharge from care along with most recent update on progress.

## 2021-09-24 ENCOUNTER — HOSPITAL ENCOUNTER (OUTPATIENT)
Dept: PHYSICAL THERAPY | Age: 61
Setting detail: THERAPIES SERIES
Discharge: HOME | End: 2021-09-24
Payer: COMMERCIAL

## 2021-09-24 PROCEDURE — 97140 MANUAL THERAPY 1/> REGIONS: CPT

## 2021-09-24 PROCEDURE — 97530 THERAPEUTIC ACTIVITIES: CPT

## 2021-09-24 PROCEDURE — 97110 THERAPEUTIC EXERCISES: CPT

## 2021-09-24 NOTE — FLOWSHEET NOTE
during gait ed. Achieved 106 deg of AAROM R knee flexion w/ strap during heel slide. Took off bandage and replaced with Tegaderm gauze. 9/7: PROM 104 deg      Not  prehab just using values as baseline   Functional testing Pre hab        Date   eval post op   Date 8/20/21 4 week f/u   9/14 8 week f/u  10/12 D/c            TUG  TBD 17.4      30 second sit to stand  TBD 0  (8 with UE assist)     6 minute walk  244 287     Balance        Narrow NIMCO  30s 30s     Semi tandem        Tandem   R 30s, L 24s R 30s, L 26s     SLS        Knee AROM  Flex 72, ext +2 Flex 116, ext -1      Knee Extension MMT R/L  -3 46#/4+     Hip aBduction MMT R/L  -3      LEFS  15 33           RESTRICTIONS/PRECAUTIONS:  B PRINCESS     Exercises/Interventions:     Therapeutic Exercises (12300) Resistance / level Sets/sec Reps Notes   Bike/ Nustep  4 min  9/13 Fwds reciprocal   9/8 Backwards reciprocal   Half reciprocol  Seat 14 (in [place])   EOB A/A Flexion  8/24-added    Long Sit Calf Stretch with Strap   8/24-added    IB  HSS supine or seated     Standing HR   8/24-added    Quad sets     Heel slides with strap     ERMI  30\" 4 8/30 added    Bridging     8/30 added    Standing HSC  4# 2 10  8/24-added   LAQ 8# 2 10 8/27 updated    SLR ecc 5 AAROM ; conc 5    8/30 added    Knee flexion lunge stretch on step  30\" 3 9/3   SAQ  9/7   Resisted hamstring curl Blue TB 9/10   SLR  9/10          Leg Press Leg Ext Leg Curl TG squats Limited range  10 9/24: Pt reports that he does not like this exercise.     Heel slide with AAROM from strap  4 x 20\" R            Therapeutic Activities (77012)       Dressing change    9/8   Gait ed with RW, for upright posture, toe off, and flexion in swing phase    9/3   Step over raulito     bars step over 3 hurdles     9/7 cues to roll off toe   9/8 cues to heel strike  9/14 Cues for WBing on R leg during stance phase   Assessment; Physical performance       Step ups 4\"  6\"  10 R  10 R B UE assist   Neuromuscular Re-ed (03433)       SAQ  8/24-added    Tandem  Tandem  Firm  airex 1 x 30\" B  2 x 30\" B                                 Manual Intervention (88728)       Patella mobs  5 min    Tibia/fib mobs/ distraction EOB      PROM Flexion       Knee Flexion contract/relax EOB  3 min  9/15 Added   Scar mobilization    Showed for home              Modalities: 9/17, 9/13, 9/10, 9/8 ,9/7, 9/3, 9/1, 8/30,8/27, 8/26, 8/24: VASO x 10'   mod pressure in long sit position    9/24: CP x 10 min    Pt. Education:  -patient educated on diagnosis, prognosis and expectations for rehab  -all patient questions were answered    Home Exercise Program:  Access Code: [email or web address]  URL: [email or web address]  Date: 08/20/2021  Prepared by: Cornelius Holley    Exercises  Supine Quad Set - 1 x daily - 7 x weekly - 3 sets - 10 reps  Supine Heel Slide with Strap - 1 x daily - 7 x weekly - 3 sets - 10 reps - 5 hold  Supine Hip Abduction - 1 x daily - 7 x weekly - 3 sets - 10 reps  Supine Bridge - 1 x daily - 7 x weekly - 3 sets - 10 reps        Therapeutic Exercise and NMR EXR  [x] (64549) Provided verbal/tactile cueing for activities related to strengthening, flexibility, endurance, ROM for improvements in  [x] LE / Lumbar: LE, proximal hip, and core control with self care, mobility, lifting, ambulation. [] UE / Cervical: cervical, postural, scapular, scapulothoracic and UE control with self care, reaching, carrying, lifting, house/yardwork, driving, computer work.  [] (87302) Provided verbal/tactile cueing for activities related to improving balance, coordination, kinesthetic sense, posture, motor skill, proprioception to assist with   [] LE / lumbar: LE, proximal hip, and core control in self care, mobility, lifting, ambulation and eccentric single leg control.    [] UE / cervical: cervical, scapular, scapulothoracic and UE control with self care, reaching, carrying, lifting, house/yardwork, driving, computer work.   [] (71315) Therapist is in constant attendance of 2 or more patients providing skilled therapy interventions, but not providing any significant amount of measurable one-on-one time to either patient, for improvements in  [] LE / lumbar: LE, proximal hip, and core control in self care, mobility, lifting, ambulation and eccentric single leg control. [] UE / cervical: cervical, scapular, scapulothoracic and UE control with self care, reaching, carrying, lifting, house/yardwork, driving, computer work. NMR and Therapeutic Activities:    [x] (95890 or 63198) Provided verbal/tactile cueing for activities related to improving balance, coordination, kinesthetic sense, posture, motor skill, proprioception and motor activation to allow for proper function of   [] LE: / Lumbar core, proximal hip and LE with self care and ADLs  [] UE / Cervical: cervical, postural, scapular, scapulothoracic and UE control with self care, carrying, lifting, driving, computer work.   [] (77128) Gait Re-education- Provided training and instruction to the patient for proper LE, core and proximal hip recruitment and positioning and eccentric body weight control with ambulation re-education including up and down stairs     Home Management Training / Self Care:  [] (45277) Provided self-care/home management training related to activities of daily living and compensatory training, and/or use of adaptive equipment for improvement with: ADLs and compensatory training, meal preparation, safety procedures and instruction in use of adaptive equipment, including bathing, grooming, dressing, personal hygiene, basic household cleaning and chores.      Home Exercise Program:    [x] (90720) Reviewed/Progressed HEP activities related to strengthening, flexibility, endurance, ROM of   [] LE / Lumbar: core, proximal hip and LE for functional self-care, mobility, lifting and ambulation/stair navigation   [] UE / Cervical: cervical, postural, scapular, scapulothoracic and UE control with self care, reaching, carrying, lifting, house/yardwork, driving, computer work  [] (11645)Reviewed/Progressed HEP activities related to improving balance, coordination, kinesthetic sense, posture, motor skill, proprioception of   [] LE: core, proximal hip and LE for self care, mobility, lifting, and ambulation/stair navigation    [] UE / Cervical: cervical, postural,  scapular, scapulothoracic and UE control with self care, reaching, carrying, lifting, house/yardwork, driving, computer work    Manual Treatments:  PROM / STM / Oscillations-Mobs:  G-I, II, III, IV (PA's, Inf., Post.)  [x] (41877) Provided manual therapy to mobilize LE, proximal hip and/or LS spine soft tissue/joints for the purpose of modulating pain, promoting relaxation,  increasing ROM, reducing/eliminating soft tissue swelling/inflammation/restriction, improving soft tissue extensibility and allowing for proper ROM for normal function with   [x] LE / lumbar: self care, mobility, lifting and ambulation. [] UE / Cervical: self care, reaching, carrying, lifting, house/yardwork, driving, computer work. Modalities:  [] (86652) Vasopneumatic compression: Utilized vasopneumatic compression to decrease edema / swelling for the purpose of improving mobility and quad tone / recruitment which will allow for increased overall function including but not limited to self-care, transfers, ambulation, and ascending / descending stairs.      Charges:  Timed Code Treatment Minutes: 40   Total Treatment Minutes: 50     [] EVAL - LOW (11270)   [] EVAL - MOD (56849)  [] EVAL - HIGH (34456)  [] RE-EVAL (43721)  [x] LD(04004) x 1    [] Ionto  [] NMR (06820) x       [] Vaso  [x] Manual (58144) x  1    [] Ultrasound  [x] TA x 1     [] Mech Traction (77182)  [] Aquatic Therapy x     [] ES (un) (06651):   [] Home Management Training x  [] ES(attended) (28987)   [] Dry Needling 1-2 muscles (85083):  [] Dry Needling 3+ muscles (902158)  [] Group:      [] Other: Gait

## 2021-09-28 ENCOUNTER — HOSPITAL ENCOUNTER (OUTPATIENT)
Dept: PHYSICAL THERAPY | Age: 61
Setting detail: THERAPIES SERIES
Discharge: HOME | End: 2021-09-28
Payer: COMMERCIAL

## 2021-09-28 NOTE — FLOWSHEET NOTE
Physical Therapy  Cancellation/No-show Note  Patient Name:  Jaun Osborne  :  1960   Date:  2021  Cancelled visits to date: 2  No-shows to date: 0    Patient status for today's appointment patient:  [x]  Cancelled ,   []  Rescheduled appointment  []  No-show     Reason given by patient:  []  Patient ill  []  Conflicting appointment  []  No transportation    []  Conflict with work  [x]  No reason given   []  Other:     Comments:      Phone call information:   []  Phone call made today to patient at _ time at number provided:      []  Patient answered, conversation as follows:    []  Patient did not answer, message left as follows:  []  Phone call not made today  [x]  Phone call not needed - pt contacted us to cancel and provided reason for cancellation.      Electronically signed by:  Dinora Bee PT

## 2021-09-29 ENCOUNTER — HOSPITAL ENCOUNTER (OUTPATIENT)
Dept: PHYSICAL THERAPY | Age: 61
Setting detail: THERAPIES SERIES
Discharge: HOME | End: 2021-09-29
Payer: COMMERCIAL

## 2021-09-29 NOTE — FLOWSHEET NOTE
Physical Therapy  Cancellation/No-show Note  Patient Name:  Suzi Wilson  :  1960   Date:  2021  Cancelled visits to date: 3  No-shows to date: 0    Patient status for today's appointment patient:  [x]  Cancelled , ,   []  Rescheduled appointment  []  No-show     Reason given by patient:  []  Patient ill  []  Conflicting appointment  []  No transportation    []  Conflict with work  []  No reason given   [x]  Other:     Comments: Pt can't make it in     Phone call information:   []  Phone call made today to patient at _ time at number provided:      []  Patient answered, conversation as follows:    []  Patient did not answer, message left as follows:  []  Phone call not made today  [x]  Phone call not needed - pt contacted us to cancel and provided reason for cancellation.      Electronically signed by:  Luisa Blankenship, PT

## 2021-10-01 ENCOUNTER — HOSPITAL ENCOUNTER (OUTPATIENT)
Dept: PHYSICAL THERAPY | Age: 61
Setting detail: THERAPIES SERIES
Discharge: HOME | End: 2021-10-01
Payer: COMMERCIAL

## 2021-10-01 NOTE — FLOWSHEET NOTE
Physical Therapy  Cancellation/No-show Note  Patient Name:  Estelle Zhao  :  1960   Date:  10/1/2021  Cancelled visits to date: 4  No-shows to date: 0    Patient status for today's appointment patient:  [x]  Cancelled , , , 10/1  []  Rescheduled appointment  []  No-show     Reason given by patient:  []  Patient ill  []  Conflicting appointment  []  No transportation    []  Conflict with work  []  No reason given   [x]  Other:     Comments: Pt still out of town    Phone call information:   []  Phone call made today to patient at 7:45 at number provided:   [phone]   []  Patient answered, conversation as follows:    [x]  Patient did not answer, message left as follows: Pt informed that this was his last scheduled visit. Reminded of no show/cancel policy, however he is post op and it is important to rehab so if he is interested in continuing, he should call and get back on the schedule as soon as possible. Requested pt please call 24 hours in advance if he knows he will not be able to attend his next scheduled visit so that it can be offered to another patient. []  Phone call not made today  []  Phone call not needed - pt contacted us to cancel and provided reason for cancellation.      Electronically signed by:  Shanna Parr, PT, DPT